# Patient Record
Sex: FEMALE | Race: WHITE | Employment: OTHER | URBAN - NONMETROPOLITAN AREA
[De-identification: names, ages, dates, MRNs, and addresses within clinical notes are randomized per-mention and may not be internally consistent; named-entity substitution may affect disease eponyms.]

---

## 2017-04-11 ENCOUNTER — DOCTOR'S OFFICE (OUTPATIENT)
Dept: URBAN - NONMETROPOLITAN AREA CLINIC 1 | Facility: CLINIC | Age: 82
Setting detail: OPHTHALMOLOGY
End: 2017-04-11
Payer: COMMERCIAL

## 2017-04-11 DIAGNOSIS — Z96.1: ICD-10-CM

## 2017-04-11 DIAGNOSIS — Z02.4: ICD-10-CM

## 2017-04-11 PROCEDURE — 92012 INTRM OPH EXAM EST PATIENT: CPT | Performed by: OPTOMETRIST

## 2017-04-11 PROCEDURE — 92015 DETERMINE REFRACTIVE STATE: CPT | Performed by: OPTOMETRIST

## 2017-04-11 ASSESSMENT — REFRACTION_OUTSIDERX
OS_SPHERE: +0.25
OS_VA3: 20/
OD_VA1: 20/30-1
OS_VA1: 20/40
OS_AXIS: 075
OD_VA3: 20/
OS_CYLINDER: -1.50
OD_ADD: +3.00
OD_CYLINDER: -1.50
OD_SPHERE: PLANO
OS_VA2: 20/40
OD_AXIS: 095
OS_ADD: +3.00
OU_VA: 20/
OD_VA2: 20/30-1

## 2017-04-11 ASSESSMENT — REFRACTION_MANIFEST
OS_VA3: 20/
OD_VA2: 20/
OS_VA2: 20/
OS_VA1: 20/
OU_VA: 20/
OS_VA2: 20/
OD_VA3: 20/
OD_VA3: 20/
OS_VA3: 20/
OU_VA: 20/
OD_VA2: 20/
OS_VA1: 20/
OD_VA1: 20/
OD_VA1: 20/

## 2017-04-12 ASSESSMENT — REFRACTION_CURRENTRX
OS_OVR_VA: 20/
OD_OVR_VA: 20/
OD_CYLINDER: -1.50
OS_OVR_VA: 20/
OS_CYLINDER: -1.50
OD_OVR_VA: 20/
OD_SPHERE: PLANO
OS_SPHERE: -0.25
OS_ADD: +3.00
OD_AXIS: 94
OS_AXIS: 81
OS_OVR_VA: 20/
OD_OVR_VA: 20/
OD_VPRISM_DIRECTION: PROGS
OS_VPRISM_DIRECTION: PROGS
OD_ADD: +3.00

## 2017-04-12 ASSESSMENT — VISUAL ACUITY
OD_BCVA: 20/40
OS_BCVA: 20/30

## 2017-04-12 ASSESSMENT — REFRACTION_AUTOREFRACTION
OS_AXIS: 77
OS_SPHERE: +1.00
OD_AXIS: 79
OS_CYLINDER: -1.75
OD_SPHERE: +0.75
OD_CYLINDER: -1.75

## 2017-04-12 ASSESSMENT — SPHEQUIV_DERIVED
OD_SPHEQUIV: -0.125
OS_SPHEQUIV: 0.125

## 2017-06-13 ENCOUNTER — DOCTOR'S OFFICE (OUTPATIENT)
Dept: URBAN - NONMETROPOLITAN AREA CLINIC 1 | Facility: CLINIC | Age: 82
Setting detail: OPHTHALMOLOGY
End: 2017-06-13
Payer: COMMERCIAL

## 2017-06-13 DIAGNOSIS — Z96.1: ICD-10-CM

## 2017-06-13 DIAGNOSIS — H04.121: ICD-10-CM

## 2017-06-13 DIAGNOSIS — H02.403: ICD-10-CM

## 2017-06-13 DIAGNOSIS — Z02.4: ICD-10-CM

## 2017-06-13 DIAGNOSIS — H04.122: ICD-10-CM

## 2017-06-13 DIAGNOSIS — H35.363: ICD-10-CM

## 2017-06-13 DIAGNOSIS — H26.492: ICD-10-CM

## 2017-06-13 PROCEDURE — 83861 MICROFLUID ANALY TEARS: CPT | Performed by: OPHTHALMOLOGY

## 2017-06-13 PROCEDURE — 92134 CPTRZ OPH DX IMG PST SGM RTA: CPT | Performed by: OPHTHALMOLOGY

## 2017-06-13 PROCEDURE — 92014 COMPRE OPH EXAM EST PT 1/>: CPT | Performed by: OPHTHALMOLOGY

## 2017-06-13 ASSESSMENT — REFRACTION_OUTSIDERX
OD_AXIS: 095
OD_VA1: 20/30-1
OS_CYLINDER: -1.50
OD_VA2: 20/30-1
OD_ADD: +3.00
OS_AXIS: 075
OS_VA1: 20/40
OS_SPHERE: +0.25
OU_VA: 20/
OS_ADD: +3.00
OD_VA3: 20/
OS_VA3: 20/
OS_VA2: 20/40
OD_CYLINDER: -1.50
OD_SPHERE: PLANO

## 2017-06-13 ASSESSMENT — SPHEQUIV_DERIVED
OD_SPHEQUIV: 0.25
OS_SPHEQUIV: -0.75

## 2017-06-13 ASSESSMENT — REFRACTION_MANIFEST
OU_VA: 20/
OS_VA2: 20/
OD_VA2: 20/
OD_VA3: 20/
OS_VA1: 20/
OS_VA1: 20/
OD_VA1: 20/
OD_VA3: 20/
OS_VA2: 20/
OS_VA3: 20/
OD_VA1: 20/
OS_VA3: 20/
OU_VA: 20/
OD_VA2: 20/

## 2017-06-13 ASSESSMENT — REFRACTION_CURRENTRX
OS_OVR_VA: 20/
OS_OVR_VA: 20/
OD_OVR_VA: 20/
OS_OVR_VA: 20/
OD_CYLINDER: -1.50
OS_SPHERE: -0.25
OS_ADD: +3.00
OS_CYLINDER: -1.50
OD_OVR_VA: 20/
OD_VPRISM_DIRECTION: PROGS
OD_ADD: +3.00
OD_SPHERE: PLANO
OS_AXIS: 79
OS_VPRISM_DIRECTION: PROGS
OD_AXIS: 95
OD_OVR_VA: 20/

## 2017-06-13 ASSESSMENT — VISUAL ACUITY
OD_BCVA: 20/50-1
OS_BCVA: 20/50+2

## 2017-06-13 ASSESSMENT — REFRACTION_AUTOREFRACTION
OS_CYLINDER: -2.00
OD_SPHERE: +1.00
OS_AXIS: 112
OS_SPHERE: +0.25
OD_CYLINDER: -1.50
OD_AXIS: 82

## 2017-06-13 ASSESSMENT — DRY EYES - PHYSICIAN NOTES
OD_GENERALCOMMENTS: KSICCA
OS_GENERALCOMMENTS: KSICCA

## 2017-06-13 ASSESSMENT — CONFRONTATIONAL VISUAL FIELD TEST (CVF)
OS_FINDINGS: FULL
OD_FINDINGS: FULL

## 2017-06-13 ASSESSMENT — SUPERFICIAL PUNCTATE KERATITIS (SPK)
OD_SPK: 1+
OS_SPK: 1+

## 2017-06-13 ASSESSMENT — LID POSITION - PTOSIS
OS_PTOSIS: LUL T
OD_PTOSIS: RUL T

## 2017-08-22 ENCOUNTER — DOCTOR'S OFFICE (OUTPATIENT)
Dept: URBAN - NONMETROPOLITAN AREA CLINIC 1 | Facility: CLINIC | Age: 82
Setting detail: OPHTHALMOLOGY
End: 2017-08-22
Payer: COMMERCIAL

## 2017-08-22 ENCOUNTER — RX ONLY (RX ONLY)
Age: 82
End: 2017-08-22

## 2017-08-22 DIAGNOSIS — Z96.1: ICD-10-CM

## 2017-08-22 DIAGNOSIS — H52.4: ICD-10-CM

## 2017-08-22 PROCEDURE — 92015 DETERMINE REFRACTIVE STATE: CPT | Performed by: OPTOMETRIST

## 2017-08-22 ASSESSMENT — REFRACTION_CURRENTRX
OD_OVR_VA: 20/
OS_ADD: +3.00
OS_OVR_VA: 20/
OD_AXIS: 101
OD_SPHERE: PLANO
OS_SPHERE: -0.25
OD_OVR_VA: 20/
OD_ADD: +3.00
OS_OVR_VA: 20/
OS_OVR_VA: 20/
OS_VPRISM_DIRECTION: PROGS
OD_VPRISM_DIRECTION: PROGS
OS_AXIS: 80
OD_CYLINDER: -1.50
OD_OVR_VA: 20/
OS_CYLINDER: -1.50

## 2017-08-22 ASSESSMENT — REFRACTION_MANIFEST
OD_VA3: 20/
OS_VA1: 20/
OD_VA1: 20/
OD_VA2: 20/
OS_VA1: 20/
OS_VA2: 20/
OD_VA1: 20/
OD_VA3: 20/
OS_VA3: 20/
OU_VA: 20/
OS_VA2: 20/
OU_VA: 20/
OS_VA3: 20/
OD_VA2: 20/

## 2017-08-22 ASSESSMENT — REFRACTION_OUTSIDERX
OD_AXIS: 095
OS_ADD: +3.00
OS_VA2: 20/50+2
OD_VA3: 20/
OS_CYLINDER: -1.50
OD_CYLINDER: -1.50
OS_VA1: 20/50+2
OD_ADD: +3.00
OD_VA1: 20/30-1
OU_VA: 20/
OD_VA2: 20/30-1
OD_SPHERE: PLANO
OS_SPHERE: +0.25
OS_AXIS: 075
OS_VA3: 20/

## 2017-08-22 ASSESSMENT — REFRACTION_AUTOREFRACTION
OS_SPHERE: +0.50
OS_AXIS: 82
OD_CYLINDER: -2.25
OS_CYLINDER: -1.00
OD_SPHERE: +0.75
OD_AXIS: 84

## 2017-08-22 ASSESSMENT — SPHEQUIV_DERIVED
OD_SPHEQUIV: -0.375
OS_SPHEQUIV: 0

## 2017-08-22 ASSESSMENT — VISUAL ACUITY
OS_BCVA: 20/30-1
OD_BCVA: 20/60-2

## 2017-12-06 ENCOUNTER — DOCTOR'S OFFICE (OUTPATIENT)
Dept: URBAN - NONMETROPOLITAN AREA CLINIC 1 | Facility: CLINIC | Age: 82
Setting detail: OPHTHALMOLOGY
End: 2017-12-06
Payer: COMMERCIAL

## 2017-12-06 DIAGNOSIS — H04.123: ICD-10-CM

## 2017-12-06 DIAGNOSIS — H04.122: ICD-10-CM

## 2017-12-06 DIAGNOSIS — H35.3131: ICD-10-CM

## 2017-12-06 DIAGNOSIS — Z96.1: ICD-10-CM

## 2017-12-06 DIAGNOSIS — H04.121: ICD-10-CM

## 2017-12-06 DIAGNOSIS — H26.492: ICD-10-CM

## 2017-12-06 PROCEDURE — 92134 CPTRZ OPH DX IMG PST SGM RTA: CPT | Performed by: OPHTHALMOLOGY

## 2017-12-06 PROCEDURE — 83861 MICROFLUID ANALY TEARS: CPT | Performed by: OPHTHALMOLOGY

## 2017-12-06 PROCEDURE — 92014 COMPRE OPH EXAM EST PT 1/>: CPT | Performed by: OPHTHALMOLOGY

## 2017-12-06 ASSESSMENT — LID POSITION - PTOSIS
OS_PTOSIS: LUL T
OD_PTOSIS: RUL T

## 2017-12-06 ASSESSMENT — REFRACTION_OUTSIDERX
OU_VA: 20/
OS_VA2: 20/50+2
OD_VA1: 20/30-1
OD_VA2: 20/30-1
OS_SPHERE: +0.25
OS_ADD: +3.00
OD_ADD: +3.00
OD_CYLINDER: -1.50
OS_AXIS: 075
OD_SPHERE: PLANO
OS_CYLINDER: -1.50
OS_VA3: 20/
OD_VA3: 20/
OD_AXIS: 095
OS_VA1: 20/50+2

## 2017-12-06 ASSESSMENT — REFRACTION_MANIFEST
OD_VA2: 20/
OD_VA3: 20/
OD_VA2: 20/
OS_VA1: 20/
OD_VA1: 20/
OS_VA2: 20/
OD_VA1: 20/
OD_VA3: 20/
OU_VA: 20/
OS_VA3: 20/
OS_VA3: 20/
OS_VA2: 20/
OU_VA: 20/
OS_VA1: 20/

## 2017-12-06 ASSESSMENT — REFRACTION_CURRENTRX
OS_ADD: +3.00
OS_VPRISM_DIRECTION: PROGS
OD_CYLINDER: -1.50
OS_OVR_VA: 20/
OD_OVR_VA: 20/
OS_AXIS: 80
OS_CYLINDER: -1.50
OD_OVR_VA: 20/
OD_ADD: +3.00
OS_SPHERE: -0.25
OD_OVR_VA: 20/
OS_OVR_VA: 20/
OD_VPRISM_DIRECTION: PROGS
OD_SPHERE: PLANO
OS_OVR_VA: 20/
OD_AXIS: 101

## 2017-12-06 ASSESSMENT — DRY EYES - PHYSICIAN NOTES
OS_GENERALCOMMENTS: KSICCA
OD_GENERALCOMMENTS: KSICCA

## 2017-12-06 ASSESSMENT — REFRACTION_AUTOREFRACTION
OS_AXIS: 82
OD_SPHERE: +0.75
OD_AXIS: 84
OS_SPHERE: +0.50
OD_CYLINDER: -2.25
OS_CYLINDER: -1.00

## 2017-12-06 ASSESSMENT — VISUAL ACUITY
OD_BCVA: 20/40
OS_BCVA: 20/50

## 2017-12-06 ASSESSMENT — SUPERFICIAL PUNCTATE KERATITIS (SPK)
OD_SPK: 1+
OS_SPK: 1+

## 2017-12-06 ASSESSMENT — SPHEQUIV_DERIVED
OS_SPHEQUIV: 0
OD_SPHEQUIV: -0.375

## 2017-12-06 ASSESSMENT — CONFRONTATIONAL VISUAL FIELD TEST (CVF)
OS_FINDINGS: FULL
OD_FINDINGS: FULL

## 2018-06-22 ENCOUNTER — DOCTOR'S OFFICE (OUTPATIENT)
Dept: URBAN - NONMETROPOLITAN AREA CLINIC 1 | Facility: CLINIC | Age: 83
Setting detail: OPHTHALMOLOGY
End: 2018-06-22
Payer: MEDICARE

## 2018-06-22 DIAGNOSIS — Z96.1: ICD-10-CM

## 2018-06-22 DIAGNOSIS — H35.373: ICD-10-CM

## 2018-06-22 DIAGNOSIS — H04.121: ICD-10-CM

## 2018-06-22 DIAGNOSIS — H26.492: ICD-10-CM

## 2018-06-22 DIAGNOSIS — H35.3131: ICD-10-CM

## 2018-06-22 DIAGNOSIS — H04.122: ICD-10-CM

## 2018-06-22 DIAGNOSIS — H04.123: ICD-10-CM

## 2018-06-22 PROCEDURE — 83861 MICROFLUID ANALY TEARS: CPT | Performed by: OPHTHALMOLOGY

## 2018-06-22 PROCEDURE — 92134 CPTRZ OPH DX IMG PST SGM RTA: CPT | Performed by: OPHTHALMOLOGY

## 2018-06-22 PROCEDURE — 92014 COMPRE OPH EXAM EST PT 1/>: CPT | Performed by: OPHTHALMOLOGY

## 2018-06-22 ASSESSMENT — SUPERFICIAL PUNCTATE KERATITIS (SPK)
OS_SPK: 1+
OD_SPK: 1+

## 2018-06-22 ASSESSMENT — DRY EYES - PHYSICIAN NOTES
OD_GENERALCOMMENTS: KSICCA
OS_GENERALCOMMENTS: KSICCA

## 2018-06-22 ASSESSMENT — REFRACTION_OUTSIDERX
OS_SPHERE: +0.25
OS_ADD: +3.00
OS_VA3: 20/
OS_CYLINDER: -1.50
OS_AXIS: 075
OS_VA2: 20/50+2
OU_VA: 20/
OD_ADD: +3.00
OD_VA2: 20/30-1
OD_CYLINDER: -1.50
OD_VA3: 20/
OD_AXIS: 095
OD_SPHERE: PLANO
OS_VA1: 20/50+2
OD_VA1: 20/30-1

## 2018-06-22 ASSESSMENT — REFRACTION_MANIFEST
OU_VA: 20/
OS_VA3: 20/
OD_VA1: 20/
OS_VA1: 20/
OS_VA2: 20/
OD_VA3: 20/
OD_VA3: 20/
OU_VA: 20/
OD_VA2: 20/
OD_VA2: 20/
OS_VA1: 20/
OD_VA1: 20/
OS_VA2: 20/
OS_VA3: 20/

## 2018-06-22 ASSESSMENT — REFRACTION_AUTOREFRACTION
OD_SPHERE: +0.25
OS_AXIS: 104
OS_SPHERE: +0.25
OD_CYLINDER: -2.25
OD_AXIS: 082
OS_CYLINDER: -2.00

## 2018-06-22 ASSESSMENT — REFRACTION_CURRENTRX
OD_SPHERE: PLANO
OS_CYLINDER: -1.50
OS_SPHERE: -0.25
OD_OVR_VA: 20/
OS_AXIS: 80
OD_VPRISM_DIRECTION: PROGS
OD_AXIS: 101
OS_ADD: +3.00
OS_OVR_VA: 20/
OD_OVR_VA: 20/
OS_VPRISM_DIRECTION: PROGS
OS_OVR_VA: 20/
OD_CYLINDER: -1.50
OS_OVR_VA: 20/
OD_OVR_VA: 20/
OD_ADD: +3.00

## 2018-06-22 ASSESSMENT — VISUAL ACUITY
OD_BCVA: 20/60-1
OS_BCVA: 20/50-1

## 2018-06-22 ASSESSMENT — CONFRONTATIONAL VISUAL FIELD TEST (CVF)
OD_FINDINGS: FULL
OS_FINDINGS: FULL

## 2018-06-22 ASSESSMENT — LID POSITION - PTOSIS
OS_PTOSIS: LUL T
OD_PTOSIS: RUL T

## 2018-06-22 ASSESSMENT — SPHEQUIV_DERIVED
OS_SPHEQUIV: -0.75
OD_SPHEQUIV: -0.875

## 2018-11-27 ENCOUNTER — DOCTOR'S OFFICE (OUTPATIENT)
Dept: URBAN - NONMETROPOLITAN AREA CLINIC 1 | Facility: CLINIC | Age: 83
Setting detail: OPHTHALMOLOGY
End: 2018-11-27
Payer: MEDICARE

## 2018-11-27 DIAGNOSIS — H26.492: ICD-10-CM

## 2018-11-27 DIAGNOSIS — H35.3131: ICD-10-CM

## 2018-11-27 DIAGNOSIS — H04.122: ICD-10-CM

## 2018-11-27 DIAGNOSIS — H35.373: ICD-10-CM

## 2018-11-27 DIAGNOSIS — H04.123: ICD-10-CM

## 2018-11-27 PROCEDURE — 92134 CPTRZ OPH DX IMG PST SGM RTA: CPT | Performed by: OPHTHALMOLOGY

## 2018-11-27 PROCEDURE — 83861 MICROFLUID ANALY TEARS: CPT | Performed by: OPHTHALMOLOGY

## 2018-11-27 PROCEDURE — 92014 COMPRE OPH EXAM EST PT 1/>: CPT | Performed by: OPHTHALMOLOGY

## 2018-11-27 ASSESSMENT — SUPERFICIAL PUNCTATE KERATITIS (SPK)
OD_SPK: 1+
OS_SPK: 1+

## 2018-11-27 ASSESSMENT — REFRACTION_CURRENTRX
OD_OVR_VA: 20/
OS_OVR_VA: 20/
OD_SPHERE: PLANO
OS_ADD: +2.50
OS_AXIS: 079
OS_OVR_VA: 20/
OS_CYLINDER: -1.75
OD_VPRISM_DIRECTION: PROGS
OS_OVR_VA: 20/
OD_OVR_VA: 20/
OD_OVR_VA: 20/
OD_ADD: +2.50
OD_AXIS: 098
OS_SPHERE: PLANO
OS_VPRISM_DIRECTION: PROGS
OD_CYLINDER: -1.75

## 2018-11-27 ASSESSMENT — CONFRONTATIONAL VISUAL FIELD TEST (CVF)
OD_FINDINGS: FULL
OS_FINDINGS: FULL

## 2018-11-27 ASSESSMENT — DRY EYES - PHYSICIAN NOTES
OS_GENERALCOMMENTS: KSICCA
OD_GENERALCOMMENTS: KSICCA

## 2018-11-27 ASSESSMENT — SPHEQUIV_DERIVED
OS_SPHEQUIV: -0.5
OS_SPHEQUIV: -0.375
OD_SPHEQUIV: -0.75

## 2018-11-27 ASSESSMENT — VISUAL ACUITY
OD_BCVA: 20/30-1
OS_BCVA: 20/40-2

## 2018-11-27 ASSESSMENT — REFRACTION_MANIFEST
OD_VA1: 20/
OD_CYLINDER: -1.50
OS_ADD: +3.00
OS_CYLINDER: -1.50
OS_VA1: 20/
OS_VA2: 20/50+2
OS_AXIS: 075
OD_AXIS: 095
OD_SPHERE: PLANO
OD_VA2: 20/30-1
OS_VA1: 20/50+2
OD_VA2: 20/
OD_ADD: +3.00
OS_VA3: 20/
OU_VA: 20/
OS_SPHERE: +0.25
OD_VA1: 20/30-1
OS_VA2: 20/
OS_VA3: 20/
OD_VA3: 20/
OU_VA: 20/
OD_VA3: 20/

## 2018-11-27 ASSESSMENT — REFRACTION_AUTOREFRACTION
OS_SPHERE: 0.00
OD_SPHERE: +0.50
OD_CYLINDER: -2.50
OD_AXIS: 077
OS_CYLINDER: -0.75
OS_AXIS: 082

## 2018-11-27 ASSESSMENT — LID POSITION - PTOSIS
OD_PTOSIS: RUL T
OS_PTOSIS: LUL T

## 2019-05-28 ENCOUNTER — DOCTOR'S OFFICE (OUTPATIENT)
Dept: URBAN - NONMETROPOLITAN AREA CLINIC 1 | Facility: CLINIC | Age: 84
Setting detail: OPHTHALMOLOGY
End: 2019-05-28
Payer: MEDICARE

## 2019-05-28 DIAGNOSIS — H26.492: ICD-10-CM

## 2019-05-28 DIAGNOSIS — H04.122: ICD-10-CM

## 2019-05-28 DIAGNOSIS — H35.373: ICD-10-CM

## 2019-05-28 DIAGNOSIS — Z96.1: ICD-10-CM

## 2019-05-28 DIAGNOSIS — H35.363: ICD-10-CM

## 2019-05-28 DIAGNOSIS — H04.123: ICD-10-CM

## 2019-05-28 DIAGNOSIS — H35.3131: ICD-10-CM

## 2019-05-28 PROCEDURE — 92134 CPTRZ OPH DX IMG PST SGM RTA: CPT | Performed by: OPHTHALMOLOGY

## 2019-05-28 PROCEDURE — 83861 MICROFLUID ANALY TEARS: CPT | Performed by: OPHTHALMOLOGY

## 2019-05-28 PROCEDURE — 92014 COMPRE OPH EXAM EST PT 1/>: CPT | Performed by: OPHTHALMOLOGY

## 2019-05-28 ASSESSMENT — REFRACTION_MANIFEST
OD_VA3: 20/
OS_VA1: 20/50+2
OS_SPHERE: +0.25
OD_VA2: 20/30-1
OS_CYLINDER: -1.50
OD_ADD: +3.00
OS_AXIS: 075
OS_VA1: 20/
OD_VA3: 20/
OS_VA3: 20/
OS_VA2: 20/50+2
OS_VA2: 20/
OD_VA1: 20/
OD_AXIS: 095
OU_VA: 20/
OD_CYLINDER: -1.50
OD_VA1: 20/30-1
OS_VA3: 20/
OU_VA: 20/
OS_ADD: +3.00
OD_SPHERE: PLANO
OD_VA2: 20/

## 2019-05-28 ASSESSMENT — REFRACTION_CURRENTRX
OS_AXIS: 072
OS_OVR_VA: 20/
OD_OVR_VA: 20/
OS_SPHERE: PLANO
OD_CYLINDER: -1.75
OS_CYLINDER: -1.75
OD_ADD: +2.50
OS_ADD: +2.50
OD_VPRISM_DIRECTION: PROGS
OD_OVR_VA: 20/
OD_OVR_VA: 20/
OS_VPRISM_DIRECTION: PROGS
OD_AXIS: 071
OS_OVR_VA: 20/
OS_OVR_VA: 20/
OD_SPHERE: PLANO

## 2019-05-28 ASSESSMENT — REFRACTION_AUTOREFRACTION
OD_SPHERE: 0.00
OS_SPHERE: -0.50
OD_AXIS: 088
OD_CYLINDER: -1.50
OS_CYLINDER: -0.25
OS_AXIS: 099

## 2019-05-28 ASSESSMENT — SPHEQUIV_DERIVED
OD_SPHEQUIV: -0.75
OS_SPHEQUIV: -0.5
OS_SPHEQUIV: -0.625

## 2019-05-28 ASSESSMENT — CONFRONTATIONAL VISUAL FIELD TEST (CVF)
OS_FINDINGS: FULL
OD_FINDINGS: FULL

## 2019-05-28 ASSESSMENT — DRY EYES - PHYSICIAN NOTES
OS_GENERALCOMMENTS: KSICCA
OD_GENERALCOMMENTS: KSICCA

## 2019-05-28 ASSESSMENT — VISUAL ACUITY
OS_BCVA: 20/50+1
OD_BCVA: 20/50-1

## 2019-05-28 ASSESSMENT — SUPERFICIAL PUNCTATE KERATITIS (SPK)
OD_SPK: 1+
OS_SPK: 1+

## 2019-05-28 ASSESSMENT — LID POSITION - PTOSIS
OD_PTOSIS: RUL T
OS_PTOSIS: LUL T

## 2019-12-13 ENCOUNTER — DOCTOR'S OFFICE (OUTPATIENT)
Dept: URBAN - NONMETROPOLITAN AREA CLINIC 1 | Facility: CLINIC | Age: 84
Setting detail: OPHTHALMOLOGY
End: 2019-12-13
Payer: MEDICARE

## 2019-12-13 DIAGNOSIS — H02.102: ICD-10-CM

## 2019-12-13 DIAGNOSIS — H02.105: ICD-10-CM

## 2019-12-13 DIAGNOSIS — H04.123: ICD-10-CM

## 2019-12-13 DIAGNOSIS — Z96.1: ICD-10-CM

## 2019-12-13 PROCEDURE — 99214 OFFICE O/P EST MOD 30 MIN: CPT | Performed by: OPHTHALMOLOGY

## 2019-12-13 ASSESSMENT — CONFRONTATIONAL VISUAL FIELD TEST (CVF)
OD_FINDINGS: FULL
OS_FINDINGS: FULL

## 2019-12-13 ASSESSMENT — DRY EYES - PHYSICIAN NOTES
OD_GENERALCOMMENTS: KSICCA
OS_GENERALCOMMENTS: KSICCA

## 2019-12-13 ASSESSMENT — SUPERFICIAL PUNCTATE KERATITIS (SPK)
OS_SPK: 1+
OD_SPK: 1+

## 2019-12-13 ASSESSMENT — LID POSITION - PTOSIS
OS_PTOSIS: LUL T
OD_PTOSIS: RUL T

## 2019-12-17 ASSESSMENT — REFRACTION_MANIFEST
OD_VA3: 20/
OD_VA3: 20/
OD_ADD: +3.00
OS_VA3: 20/
OS_VA3: 20/
OU_VA: 20/
OD_VA2: 20/
OD_VA1: 20/
OS_VA2: 20/50+2
OS_ADD: +3.00
OD_SPHERE: PLANO
OS_CYLINDER: -1.50
OS_VA1: 20/50+2
OD_VA2: 20/30-1
OS_AXIS: 075
OS_VA2: 20/
OD_AXIS: 095
OD_CYLINDER: -1.50
OS_VA1: 20/
OU_VA: 20/
OD_VA1: 20/30-1
OS_SPHERE: +0.25

## 2019-12-17 ASSESSMENT — REFRACTION_CURRENTRX
OD_OVR_VA: 20/
OS_OVR_VA: 20/
OD_AXIS: 071
OS_AXIS: 072
OD_OVR_VA: 20/
OD_CYLINDER: -1.75
OS_ADD: +2.50
OS_VPRISM_DIRECTION: PROGS
OD_ADD: +2.50
OD_VPRISM_DIRECTION: PROGS
OD_SPHERE: PLANO
OS_OVR_VA: 20/
OS_SPHERE: PLANO
OS_OVR_VA: 20/
OS_CYLINDER: -1.75
OD_OVR_VA: 20/

## 2019-12-17 ASSESSMENT — REFRACTION_AUTOREFRACTION
OD_CYLINDER: -1.75
OS_SPHERE: +0.25
OD_AXIS: 101
OS_AXIS: 70
OD_SPHERE: 0.00
OS_CYLINDER: -1.00

## 2019-12-17 ASSESSMENT — SPHEQUIV_DERIVED
OS_SPHEQUIV: -0.25
OD_SPHEQUIV: -0.875
OS_SPHEQUIV: -0.5

## 2019-12-17 ASSESSMENT — VISUAL ACUITY
OD_BCVA: 20/50-1
OS_BCVA: 20/60-2

## 2019-12-27 ENCOUNTER — RX ONLY (RX ONLY)
Age: 84
End: 2019-12-27

## 2019-12-27 ENCOUNTER — DOCTOR'S OFFICE (OUTPATIENT)
Dept: URBAN - NONMETROPOLITAN AREA CLINIC 1 | Facility: CLINIC | Age: 84
Setting detail: OPHTHALMOLOGY
End: 2019-12-27
Payer: MEDICARE

## 2019-12-27 DIAGNOSIS — H04.122: ICD-10-CM

## 2019-12-27 DIAGNOSIS — H02.102: ICD-10-CM

## 2019-12-27 DIAGNOSIS — Z96.1: ICD-10-CM

## 2019-12-27 DIAGNOSIS — H04.121: ICD-10-CM

## 2019-12-27 DIAGNOSIS — H02.105: ICD-10-CM

## 2019-12-27 DIAGNOSIS — H10.503: ICD-10-CM

## 2019-12-27 PROCEDURE — 92012 INTRM OPH EXAM EST PATIENT: CPT | Performed by: OPHTHALMOLOGY

## 2019-12-27 ASSESSMENT — SPHEQUIV_DERIVED
OS_SPHEQUIV: -0.5
OS_SPHEQUIV: -0.75
OD_SPHEQUIV: 0

## 2019-12-27 ASSESSMENT — REFRACTION_MANIFEST
OD_VA3: 20/
OS_AXIS: 075
OD_AXIS: 095
OS_VA2: 20/30-1
OS_CYLINDER: -1.50
OD_CYLINDER: -1.50
OD_ADD: +3.00
OS_VA1: 20/50+2
OD_SPHERE: PLANO
OS_ADD: +3.00
OS_SPHERE: +0.25
OU_VA: 20/
OD_VA2: 20/30-1
OS_VA3: 20/
OD_VA1: 20/30-1

## 2019-12-27 ASSESSMENT — REFRACTION_AUTOREFRACTION
OS_AXIS: 128
OD_CYLINDER: -3.00
OS_CYLINDER: -0.50
OD_SPHERE: +1.50
OS_SPHERE: -0.50
OD_AXIS: 091

## 2019-12-27 ASSESSMENT — REFRACTION_CURRENTRX
OS_AXIS: 072
OD_VPRISM_DIRECTION: PROGS
OS_CYLINDER: -1.75
OS_ADD: +2.50
OD_CYLINDER: -1.75
OS_VPRISM_DIRECTION: PROGS
OS_SPHERE: PLANO
OD_OVR_VA: 20/
OS_OVR_VA: 20/
OD_ADD: +2.50
OD_AXIS: 071
OD_SPHERE: PLANO

## 2019-12-27 ASSESSMENT — VISUAL ACUITY
OS_BCVA: 20/60-2
OD_BCVA: 20/50-2

## 2019-12-27 ASSESSMENT — LID POSITION - ENTROPION
OS_ENTROPION: ABSENT
OD_ENTROPION: ABSENT

## 2019-12-27 ASSESSMENT — SUPERFICIAL PUNCTATE KERATITIS (SPK)
OS_SPK: T
OD_SPK: 1+ 2+

## 2019-12-27 ASSESSMENT — CONFRONTATIONAL VISUAL FIELD TEST (CVF)
OD_FINDINGS: FULL
OS_FINDINGS: FULL

## 2019-12-27 ASSESSMENT — DRY EYES - PHYSICIAN NOTES
OS_GENERALCOMMENTS: KSICCA
OD_GENERALCOMMENTS: KSICCA

## 2019-12-27 ASSESSMENT — LID POSITION - PTOSIS
OS_PTOSIS: LUL T
OD_PTOSIS: RUL T

## 2020-06-09 ENCOUNTER — DOCTOR'S OFFICE (OUTPATIENT)
Dept: URBAN - NONMETROPOLITAN AREA CLINIC 1 | Facility: CLINIC | Age: 85
Setting detail: OPHTHALMOLOGY
End: 2020-06-09
Payer: MEDICARE

## 2020-06-09 DIAGNOSIS — H35.373: ICD-10-CM

## 2020-06-09 DIAGNOSIS — H02.102: ICD-10-CM

## 2020-06-09 DIAGNOSIS — Z96.1: ICD-10-CM

## 2020-06-09 DIAGNOSIS — H35.3131: ICD-10-CM

## 2020-06-09 DIAGNOSIS — H04.121: ICD-10-CM

## 2020-06-09 DIAGNOSIS — H02.105: ICD-10-CM

## 2020-06-09 DIAGNOSIS — H04.123: ICD-10-CM

## 2020-06-09 DIAGNOSIS — H04.122: ICD-10-CM

## 2020-06-09 PROBLEM — H10.503 BLEPHAROCONUNCTIVITIS, UNSPECIFIED; BOTH EYES: Status: RESOLVED | Noted: 2019-12-13 | Resolved: 2020-06-09

## 2020-06-09 PROCEDURE — 92134 CPTRZ OPH DX IMG PST SGM RTA: CPT | Performed by: OPHTHALMOLOGY

## 2020-06-09 PROCEDURE — 83861 MICROFLUID ANALY TEARS: CPT | Performed by: OPHTHALMOLOGY

## 2020-06-09 PROCEDURE — 92014 COMPRE OPH EXAM EST PT 1/>: CPT | Performed by: OPHTHALMOLOGY

## 2020-06-09 ASSESSMENT — REFRACTION_CURRENTRX
OD_ADD: +3.00
OS_AXIS: 081
OS_VPRISM_DIRECTION: PROGS
OS_SPHERE: PLANO
OS_OVR_VA: 20/
OD_OVR_VA: 20/
OS_ADD: +3.00
OS_CYLINDER: -1.50
OD_SPHERE: PLANO
OD_CYLINDER: -1.50
OD_VPRISM_DIRECTION: PROGS
OD_AXIS: 094

## 2020-06-09 ASSESSMENT — REFRACTION_MANIFEST
OS_VA2: 20/30-1
OS_VA1: 20/50+2
OD_SPHERE: PLANO
OS_ADD: +3.00
OS_SPHERE: +0.25
OS_AXIS: 075
OD_ADD: +3.00
OD_CYLINDER: -1.50
OS_CYLINDER: -1.50
OD_AXIS: 095
OD_VA2: 20/30-1
OD_VA1: 20/30-1

## 2020-06-09 ASSESSMENT — DRY EYES - PHYSICIAN NOTES
OD_GENERALCOMMENTS: KSICCA
OS_GENERALCOMMENTS: KSICCA

## 2020-06-09 ASSESSMENT — REFRACTION_AUTOREFRACTION
OD_SPHERE: +1.00
OS_SPHERE: +0.25
OS_AXIS: 092
OD_AXIS: 088
OD_CYLINDER: -3.00
OS_CYLINDER: -1.50

## 2020-06-09 ASSESSMENT — LID POSITION - PTOSIS
OS_PTOSIS: LUL T
OD_PTOSIS: RUL T

## 2020-06-09 ASSESSMENT — LID POSITION - ENTROPION
OS_ENTROPION: ABSENT
OD_ENTROPION: ABSENT

## 2020-06-09 ASSESSMENT — CONFRONTATIONAL VISUAL FIELD TEST (CVF)
OD_FINDINGS: FULL
OS_FINDINGS: FULL

## 2020-06-09 ASSESSMENT — SPHEQUIV_DERIVED
OD_SPHEQUIV: -0.5
OS_SPHEQUIV: -0.5
OS_SPHEQUIV: -0.5

## 2020-06-09 ASSESSMENT — VISUAL ACUITY
OD_BCVA: 20/50-2
OS_BCVA: 20/50

## 2021-01-05 ENCOUNTER — DOCTOR'S OFFICE (OUTPATIENT)
Dept: URBAN - NONMETROPOLITAN AREA CLINIC 1 | Facility: CLINIC | Age: 86
Setting detail: OPHTHALMOLOGY
End: 2021-01-05
Payer: MEDICARE

## 2021-01-05 DIAGNOSIS — H04.123: ICD-10-CM

## 2021-01-05 DIAGNOSIS — H26.492: ICD-10-CM

## 2021-01-05 DIAGNOSIS — H35.373: ICD-10-CM

## 2021-01-05 DIAGNOSIS — Z96.1: ICD-10-CM

## 2021-01-05 DIAGNOSIS — H35.3131: ICD-10-CM

## 2021-01-05 PROBLEM — H02.40 PTOSIS; BOTH EYES: Status: ACTIVE | Noted: 2020-06-09

## 2021-01-05 PROBLEM — H50.15: Status: ACTIVE | Noted: 2020-06-09

## 2021-01-05 PROBLEM — H02.43 PTOSIS; BOTH EYES: Status: ACTIVE | Noted: 2020-06-09

## 2021-01-05 PROBLEM — H02.41 PTOSIS; BOTH EYES: Status: ACTIVE | Noted: 2020-06-09

## 2021-01-05 PROBLEM — H02.105 ECTROPION; RIGHT LOWER LID, LEFT LOWER LID: Status: ACTIVE | Noted: 2019-12-13

## 2021-01-05 PROBLEM — H04.121 DRY EYE; RIGHT EYE, LEFT EYE: Status: ACTIVE | Noted: 2017-06-13

## 2021-01-05 PROBLEM — H02.102 ECTROPION; RIGHT LOWER LID, LEFT LOWER LID: Status: ACTIVE | Noted: 2019-12-13

## 2021-01-05 PROBLEM — H04.122 DRY EYE; RIGHT EYE, LEFT EYE: Status: ACTIVE | Noted: 2017-06-13

## 2021-01-05 PROBLEM — Z02.4 ENCOUNTER FOR EXAMINATION FOR DRIVING LICENSE: Status: ACTIVE | Noted: 2017-04-11

## 2021-01-05 PROBLEM — H02.42 PTOSIS; BOTH EYES: Status: ACTIVE | Noted: 2020-06-09

## 2021-01-05 PROCEDURE — 92134 CPTRZ OPH DX IMG PST SGM RTA: CPT | Performed by: OPHTHALMOLOGY

## 2021-01-05 PROCEDURE — 92012 INTRM OPH EXAM EST PATIENT: CPT | Performed by: OPHTHALMOLOGY

## 2021-01-05 ASSESSMENT — SPHEQUIV_DERIVED
OD_SPHEQUIV: -0.5
OS_SPHEQUIV: -0.5
OS_SPHEQUIV: -0.5

## 2021-01-05 ASSESSMENT — TONOMETRY
OS_IOP_MMHG: 15
OD_IOP_MMHG: 15

## 2021-01-05 ASSESSMENT — REFRACTION_MANIFEST
OS_AXIS: 075
OD_AXIS: 095
OS_VA2: 20/30-1
OD_SPHERE: PLANO
OD_VA2: 20/30-1
OS_ADD: +3.00
OS_VA1: 20/50+2
OS_SPHERE: +0.25
OS_CYLINDER: -1.50
OD_CYLINDER: -1.50
OD_ADD: +3.00
OD_VA1: 20/30-1

## 2021-01-05 ASSESSMENT — REFRACTION_CURRENTRX
OD_CYLINDER: -1.50
OS_ADD: +3.00
OD_SPHERE: PLANO
OS_VPRISM_DIRECTION: PROGS
OS_CYLINDER: -1.50
OS_AXIS: 081
OS_OVR_VA: 20/
OD_AXIS: 094
OD_VPRISM_DIRECTION: PROGS
OD_ADD: +3.00
OS_SPHERE: PLANO
OD_OVR_VA: 20/

## 2021-01-05 ASSESSMENT — REFRACTION_AUTOREFRACTION
OS_SPHERE: +0.25
OS_CYLINDER: -1.50
OD_CYLINDER: -3.00
OD_SPHERE: +1.00
OS_AXIS: 092
OD_AXIS: 088

## 2021-01-05 ASSESSMENT — LID POSITION - PTOSIS
OD_PTOSIS: RUL T
OS_PTOSIS: LUL T

## 2021-01-05 ASSESSMENT — DRY EYES - PHYSICIAN NOTES
OD_GENERALCOMMENTS: KSICCA
OS_GENERALCOMMENTS: KSICCA

## 2021-01-05 ASSESSMENT — MACULA - DRUSEN
OD_DRUSEN: 1+ T
OS_DRUSEN: 1+ T

## 2021-01-05 ASSESSMENT — VISUAL ACUITY
OS_BCVA: 20/50
OD_BCVA: 20/50+1

## 2021-01-05 ASSESSMENT — LID POSITION - ENTROPION
OS_ENTROPION: ABSENT
OD_ENTROPION: ABSENT

## 2021-02-12 ENCOUNTER — IMMUNIZATIONS (OUTPATIENT)
Dept: FAMILY MEDICINE CLINIC | Facility: HOSPITAL | Age: 86
End: 2021-02-12

## 2021-02-12 DIAGNOSIS — Z23 ENCOUNTER FOR IMMUNIZATION: Primary | ICD-10-CM

## 2021-02-12 PROCEDURE — 91301 SARS-COV-2 / COVID-19 MRNA VACCINE (MODERNA) 100 MCG: CPT

## 2021-02-12 PROCEDURE — 0011A SARS-COV-2 / COVID-19 MRNA VACCINE (MODERNA) 100 MCG: CPT

## 2021-03-03 ENCOUNTER — TRANSCRIBE ORDERS (OUTPATIENT)
Dept: ADMINISTRATIVE | Facility: HOSPITAL | Age: 86
End: 2021-03-03

## 2021-03-03 ENCOUNTER — HOSPITAL ENCOUNTER (OUTPATIENT)
Dept: NON INVASIVE DIAGNOSTICS | Facility: HOSPITAL | Age: 86
Discharge: HOME | End: 2021-03-03
Payer: COMMERCIAL

## 2021-03-03 DIAGNOSIS — M79.606 LEG PAIN: Primary | ICD-10-CM

## 2021-03-03 DIAGNOSIS — M79.606 LEG PAIN: ICD-10-CM

## 2021-03-03 DIAGNOSIS — M79.89 LEG SWELLING: ICD-10-CM

## 2021-03-03 PROCEDURE — 93970 EXTREMITY STUDY: CPT

## 2021-03-03 PROCEDURE — 93970 EXTREMITY STUDY: CPT | Performed by: SURGERY

## 2021-03-11 ENCOUNTER — IMMUNIZATIONS (OUTPATIENT)
Dept: FAMILY MEDICINE CLINIC | Facility: HOSPITAL | Age: 86
End: 2021-03-11

## 2021-03-11 DIAGNOSIS — Z23 ENCOUNTER FOR IMMUNIZATION: Primary | ICD-10-CM

## 2021-03-11 PROCEDURE — 91301 SARS-COV-2 / COVID-19 MRNA VACCINE (MODERNA) 100 MCG: CPT

## 2021-03-11 PROCEDURE — 0012A SARS-COV-2 / COVID-19 MRNA VACCINE (MODERNA) 100 MCG: CPT

## 2021-08-27 ENCOUNTER — APPOINTMENT (EMERGENCY)
Dept: NON INVASIVE DIAGNOSTICS | Facility: HOSPITAL | Age: 86
DRG: 291 | End: 2021-08-27
Payer: COMMERCIAL

## 2021-08-27 ENCOUNTER — HOSPITAL ENCOUNTER (INPATIENT)
Facility: HOSPITAL | Age: 86
LOS: 3 days | DRG: 291 | End: 2021-08-30
Attending: EMERGENCY MEDICINE | Admitting: FAMILY MEDICINE
Payer: COMMERCIAL

## 2021-08-27 ENCOUNTER — APPOINTMENT (EMERGENCY)
Dept: RADIOLOGY | Facility: HOSPITAL | Age: 86
DRG: 291 | End: 2021-08-27
Payer: COMMERCIAL

## 2021-08-27 DIAGNOSIS — I50.9 CHF (CONGESTIVE HEART FAILURE) (HCC): Primary | ICD-10-CM

## 2021-08-27 DIAGNOSIS — I48.91 NEW ONSET A-FIB (HCC): ICD-10-CM

## 2021-08-27 DIAGNOSIS — R09.02 HYPOXIA: ICD-10-CM

## 2021-08-27 DIAGNOSIS — R60.0 BILATERAL LEG EDEMA: ICD-10-CM

## 2021-08-27 DIAGNOSIS — I63.9 ACUTE STROKE DUE TO ISCHEMIA (HCC): ICD-10-CM

## 2021-08-27 PROBLEM — N17.9 AKI (ACUTE KIDNEY INJURY) (HCC): Status: ACTIVE | Noted: 2021-08-27

## 2021-08-27 PROBLEM — I95.0 IDIOPATHIC HYPOTENSION: Status: ACTIVE | Noted: 2021-08-27

## 2021-08-27 PROBLEM — J96.01 ACUTE RESPIRATORY FAILURE WITH HYPOXIA (HCC): Status: ACTIVE | Noted: 2021-08-27

## 2021-08-27 PROBLEM — I50.31 ACUTE DIASTOLIC CHF (CONGESTIVE HEART FAILURE) (HCC): Status: ACTIVE | Noted: 2021-08-27

## 2021-08-27 LAB
ALBUMIN SERPL BCP-MCNC: 3.5 G/DL (ref 3.5–5)
ALP SERPL-CCNC: 54 U/L (ref 46–116)
ALT SERPL W P-5'-P-CCNC: 13 U/L (ref 12–78)
ANION GAP SERPL CALCULATED.3IONS-SCNC: 10 MMOL/L (ref 4–13)
APTT PPP: 33 SECONDS (ref 23–37)
AST SERPL W P-5'-P-CCNC: 15 U/L (ref 5–45)
BASE EX.OXY STD BLDV CALC-SCNC: 84.2 % (ref 60–80)
BASE EXCESS BLDV CALC-SCNC: -4 MMOL/L
BASOPHILS # BLD AUTO: 0.03 THOUSANDS/ΜL (ref 0–0.1)
BASOPHILS NFR BLD AUTO: 0 % (ref 0–1)
BILIRUB SERPL-MCNC: 0.95 MG/DL (ref 0.2–1)
BUN SERPL-MCNC: 35 MG/DL (ref 5–25)
CALCIUM SERPL-MCNC: 9 MG/DL (ref 8.3–10.1)
CHLORIDE SERPL-SCNC: 104 MMOL/L (ref 100–108)
CO2 SERPL-SCNC: 25 MMOL/L (ref 21–32)
CREAT SERPL-MCNC: 1.62 MG/DL (ref 0.6–1.3)
EOSINOPHIL # BLD AUTO: 0.02 THOUSAND/ΜL (ref 0–0.61)
EOSINOPHIL NFR BLD AUTO: 0 % (ref 0–6)
ERYTHROCYTE [DISTWIDTH] IN BLOOD BY AUTOMATED COUNT: 18 % (ref 11.6–15.1)
GFR SERPL CREATININE-BSD FRML MDRD: 27 ML/MIN/1.73SQ M
GLUCOSE SERPL-MCNC: 125 MG/DL (ref 65–140)
HCO3 BLDV-SCNC: 20.1 MMOL/L (ref 24–30)
HCT VFR BLD AUTO: 40.1 % (ref 34.8–46.1)
HGB BLD-MCNC: 12.9 G/DL (ref 11.5–15.4)
IMM GRANULOCYTES # BLD AUTO: 0.03 THOUSAND/UL (ref 0–0.2)
IMM GRANULOCYTES NFR BLD AUTO: 0 % (ref 0–2)
INR PPP: 1.26 (ref 0.84–1.19)
LACTATE SERPL-SCNC: 1.6 MMOL/L (ref 0.5–2)
LYMPHOCYTES # BLD AUTO: 0.63 THOUSANDS/ΜL (ref 0.6–4.47)
LYMPHOCYTES NFR BLD AUTO: 7 % (ref 14–44)
MAGNESIUM SERPL-MCNC: 2.4 MG/DL (ref 1.6–2.6)
MCH RBC QN AUTO: 30.7 PG (ref 26.8–34.3)
MCHC RBC AUTO-ENTMCNC: 32.2 G/DL (ref 31.4–37.4)
MCV RBC AUTO: 96 FL (ref 82–98)
MONOCYTES # BLD AUTO: 0.59 THOUSAND/ΜL (ref 0.17–1.22)
MONOCYTES NFR BLD AUTO: 6 % (ref 4–12)
NEUTROPHILS # BLD AUTO: 8.19 THOUSANDS/ΜL (ref 1.85–7.62)
NEUTS SEG NFR BLD AUTO: 87 % (ref 43–75)
NRBC BLD AUTO-RTO: 0 /100 WBCS
NT-PROBNP SERPL-MCNC: ABNORMAL PG/ML
O2 CT BLDV-SCNC: 16.3 ML/DL
PCO2 BLDV: 33.7 MM HG (ref 42–50)
PH BLDV: 7.39 [PH] (ref 7.3–7.4)
PLATELET # BLD AUTO: 211 THOUSANDS/UL (ref 149–390)
PMV BLD AUTO: 10.2 FL (ref 8.9–12.7)
PO2 BLDV: 53.7 MM HG (ref 35–45)
POTASSIUM SERPL-SCNC: 4.3 MMOL/L (ref 3.5–5.3)
PROT SERPL-MCNC: 6.5 G/DL (ref 6.4–8.2)
PROTHROMBIN TIME: 15.5 SECONDS (ref 11.6–14.5)
RBC # BLD AUTO: 4.2 MILLION/UL (ref 3.81–5.12)
SODIUM SERPL-SCNC: 139 MMOL/L (ref 136–145)
TROPONIN I SERPL-MCNC: <0.02 NG/ML
WBC # BLD AUTO: 9.49 THOUSAND/UL (ref 4.31–10.16)

## 2021-08-27 PROCEDURE — 85730 THROMBOPLASTIN TIME PARTIAL: CPT | Performed by: PHYSICIAN ASSISTANT

## 2021-08-27 PROCEDURE — 85610 PROTHROMBIN TIME: CPT | Performed by: PHYSICIAN ASSISTANT

## 2021-08-27 PROCEDURE — 36415 COLL VENOUS BLD VENIPUNCTURE: CPT | Performed by: PHYSICIAN ASSISTANT

## 2021-08-27 PROCEDURE — 83605 ASSAY OF LACTIC ACID: CPT | Performed by: PHYSICIAN ASSISTANT

## 2021-08-27 PROCEDURE — 82805 BLOOD GASES W/O2 SATURATION: CPT | Performed by: PHYSICIAN ASSISTANT

## 2021-08-27 PROCEDURE — 96365 THER/PROPH/DIAG IV INF INIT: CPT

## 2021-08-27 PROCEDURE — 83880 ASSAY OF NATRIURETIC PEPTIDE: CPT | Performed by: PHYSICIAN ASSISTANT

## 2021-08-27 PROCEDURE — 83735 ASSAY OF MAGNESIUM: CPT | Performed by: PHYSICIAN ASSISTANT

## 2021-08-27 PROCEDURE — 93970 EXTREMITY STUDY: CPT

## 2021-08-27 PROCEDURE — 71045 X-RAY EXAM CHEST 1 VIEW: CPT

## 2021-08-27 PROCEDURE — 99285 EMERGENCY DEPT VISIT HI MDM: CPT

## 2021-08-27 PROCEDURE — 85025 COMPLETE CBC W/AUTO DIFF WBC: CPT | Performed by: PHYSICIAN ASSISTANT

## 2021-08-27 PROCEDURE — 99223 1ST HOSP IP/OBS HIGH 75: CPT | Performed by: FAMILY MEDICINE

## 2021-08-27 PROCEDURE — 84484 ASSAY OF TROPONIN QUANT: CPT | Performed by: PHYSICIAN ASSISTANT

## 2021-08-27 PROCEDURE — 96366 THER/PROPH/DIAG IV INF ADDON: CPT

## 2021-08-27 PROCEDURE — 80053 COMPREHEN METABOLIC PANEL: CPT | Performed by: PHYSICIAN ASSISTANT

## 2021-08-27 PROCEDURE — 96375 TX/PRO/DX INJ NEW DRUG ADDON: CPT

## 2021-08-27 PROCEDURE — 99285 EMERGENCY DEPT VISIT HI MDM: CPT | Performed by: PHYSICIAN ASSISTANT

## 2021-08-27 PROCEDURE — 93005 ELECTROCARDIOGRAM TRACING: CPT

## 2021-08-27 RX ORDER — FUROSEMIDE 40 MG/1
40 TABLET ORAL DAILY
COMMUNITY
Start: 2021-03-31 | End: 2021-08-30 | Stop reason: HOSPADM

## 2021-08-27 RX ORDER — ONDANSETRON 2 MG/ML
4 INJECTION INTRAMUSCULAR; INTRAVENOUS EVERY 6 HOURS PRN
Status: DISCONTINUED | OUTPATIENT
Start: 2021-08-27 | End: 2021-08-30 | Stop reason: HOSPADM

## 2021-08-27 RX ORDER — DIGOXIN 125 MCG
250 TABLET ORAL ONCE
Status: COMPLETED | OUTPATIENT
Start: 2021-08-27 | End: 2021-08-27

## 2021-08-27 RX ORDER — HEPARIN SODIUM 5000 [USP'U]/ML
5000 INJECTION, SOLUTION INTRAVENOUS; SUBCUTANEOUS EVERY 8 HOURS SCHEDULED
Status: DISCONTINUED | OUTPATIENT
Start: 2021-08-27 | End: 2021-08-28

## 2021-08-27 RX ORDER — EZETIMIBE 10 MG/1
10 TABLET ORAL DAILY
COMMUNITY
End: 2021-08-30 | Stop reason: HOSPADM

## 2021-08-27 RX ORDER — NIFEDIPINE 60 MG/1
60 TABLET, EXTENDED RELEASE ORAL DAILY
COMMUNITY
End: 2021-08-30 | Stop reason: HOSPADM

## 2021-08-27 RX ORDER — EZETIMIBE 10 MG/1
10 TABLET ORAL DAILY
Status: DISCONTINUED | OUTPATIENT
Start: 2021-08-28 | End: 2021-08-29

## 2021-08-27 RX ORDER — ASCORBIC ACID 500 MG
1000 TABLET ORAL DAILY
COMMUNITY
End: 2021-08-30 | Stop reason: HOSPADM

## 2021-08-27 RX ORDER — ALPRAZOLAM 0.25 MG/1
0.25 TABLET ORAL AS NEEDED
COMMUNITY
End: 2021-08-30 | Stop reason: HOSPADM

## 2021-08-27 RX ORDER — MIDODRINE HYDROCHLORIDE 5 MG/1
2.5 TABLET ORAL
Status: DISCONTINUED | OUTPATIENT
Start: 2021-08-27 | End: 2021-08-28

## 2021-08-27 RX ORDER — DILTIAZEM HYDROCHLORIDE 5 MG/ML
10 INJECTION INTRAVENOUS ONCE
Status: DISCONTINUED | OUTPATIENT
Start: 2021-08-27 | End: 2021-08-27

## 2021-08-27 RX ORDER — FUROSEMIDE 10 MG/ML
40 INJECTION INTRAMUSCULAR; INTRAVENOUS ONCE
Status: COMPLETED | OUTPATIENT
Start: 2021-08-27 | End: 2021-08-27

## 2021-08-27 RX ORDER — CALCIUM CARBONATE 200(500)MG
1000 TABLET,CHEWABLE ORAL DAILY PRN
Status: DISCONTINUED | OUTPATIENT
Start: 2021-08-27 | End: 2021-08-29

## 2021-08-27 RX ORDER — FUROSEMIDE 10 MG/ML
20 INJECTION INTRAMUSCULAR; INTRAVENOUS
Status: DISCONTINUED | OUTPATIENT
Start: 2021-08-27 | End: 2021-08-28

## 2021-08-27 RX ORDER — POTASSIUM CHLORIDE 20 MEQ/1
1 TABLET, EXTENDED RELEASE ORAL 2 TIMES DAILY
COMMUNITY
Start: 2021-03-10 | End: 2021-08-30 | Stop reason: HOSPADM

## 2021-08-27 RX ORDER — ACETAMINOPHEN 325 MG/1
650 TABLET ORAL EVERY 6 HOURS PRN
Status: DISCONTINUED | OUTPATIENT
Start: 2021-08-27 | End: 2021-08-30 | Stop reason: HOSPADM

## 2021-08-27 RX ADMIN — FUROSEMIDE 40 MG: 10 INJECTION, SOLUTION INTRAMUSCULAR; INTRAVENOUS at 11:55

## 2021-08-27 RX ADMIN — HEPARIN SODIUM 5000 UNITS: 5000 INJECTION INTRAVENOUS; SUBCUTANEOUS at 18:06

## 2021-08-27 RX ADMIN — DILTIAZEM HYDROCHLORIDE 5 MG/HR: 5 INJECTION INTRAVENOUS at 13:44

## 2021-08-27 RX ADMIN — MIDODRINE HYDROCHLORIDE 2.5 MG: 5 TABLET ORAL at 20:45

## 2021-08-27 RX ADMIN — DIGOXIN 250 MCG: 125 TABLET ORAL at 15:33

## 2021-08-27 RX ADMIN — DIGOXIN 250 MCG: 125 TABLET ORAL at 22:34

## 2021-08-27 NOTE — ASSESSMENT & PLAN NOTE
Patient has new onset AFib with RVR  Blood pressures are low normal   Currently on Cardizem drip 15 mcg however heart rates are still uncontrolled  Continue Cardizem drip and titrate and try to keep systolic blood pressure more than 90  Also receiving digoxin as per Cardiology with a loading dose  Continue telemetry monitoring and will place on level 2 step-down  Patient is DNR/DNI and does not want to be cardioverted  Does not want full strength anticoagulation    Does not want any aggressive or invasive interventions to be done

## 2021-08-27 NOTE — ASSESSMENT & PLAN NOTE
Patient has acute kidney injury with creatinine of 1 6  Previous labs from a few years ago showed normal creatinine of 1  Continue gentle diuresis  Avoid hypotension and nephrotoxic agents

## 2021-08-27 NOTE — ASSESSMENT & PLAN NOTE
Wt Readings from Last 3 Encounters:   08/27/21 49 5 kg (109 lb 2 oz)   Patient has acute CHF exacerbation  No previous 2D echo noted in the system  Was on Lasix at home which was recently being adjusted outpatient by PCP due to worsening leg swelling  Noticed to have anasarca today with 2 to 3+ pitting edema bilateral lower extremity and bilateral pleural effusions  Ordered 2D echo for Monday once rate is better controlled  Received Lasix 40 mg IV x1 in the ER  Will continue Lasix 20 mg IV b i d  Low-dose due to hypotension and try to gently diurese if possible  Please note patient does not want any invasive or aggressive interventions to be done and hence will not consider thoracentesis at this time  Goals of care discussed at length with patient and niece at bedside  Patient will be DNR/DNI with no cardioversion or aggressive invasive interventions to be done  Patient family understand that due to her advanced age and critical condition at this time it is quite possible that she might not survive this episode    Will try medications however if she appears to be decompensating and worsening will discuss about comfort care

## 2021-08-27 NOTE — Clinical Note
Case was discussed with ivon  and the patient's admission status was agreed to be Admission Status: inpatient status to the service of Dr Alva Crocker

## 2021-08-27 NOTE — CONSULTS
Consultation - Cardiology   [facility] 80 y o  female MRN: [id number]  Unit/Bed#: ED 07 Encounter: [id number]    Assessment/Plan     Assessment:  Afib with RVR- unknown duration, started on cardizem drip   Acute on chronic HF unknown EF- elevated bnp, pleural effusions, sob, edema  HTN  HLD  Chadsvasc 4    Plan:  1  Currently on cardizem Drip for rate control titrating as needed pending BP response  2  Discussed anticoagulation  She declines  Discussed possible MAMTA DCCV in the future and pt declines completely  She is agreeable to medications to help her but declines invasive measures  Given this will pursue rate control strategy and avoid antiarrhythmics  3  Continue with diuresis with lasix 40 IV BID, monitor I and O, monitor daily standing weights, monitor renal function and electrolytes  4  Echo to be updated once HR better controlled  5  Start digoxin 250 mcg now and again in 6 h  History of Present Illness   Physician Requesting Consult: Nae Claros,   Reason for Consult / Principal Problem: afib with RVR, Acute HF  HPI: [facility] is a 80y o  year old female with history of HTN, HLD who is here for CHF and new onset afib  Reports that she has had issues with edema for the last 4 months  She had lasix titrated a few different times  She also used compression stockings in the legs which helped  In the last few days she noted her swelling got worse and she noted sob for the first time  On admission her bnp was elevated  She has bilateral pleural effusions noted on cxr  She was noted to be in afib with rvr  She has no prior history of afib  She is not anticoagulated  She has no hx of abnormal bleeding  She reports at home she is very sedentary  People cook and clean for her  She has no history of stroke in the past  She has not had any falls at home  Consults    Review of Systems   Constitutional: Positive for fatigue and unexpected weight change  Negative for chills  Respiratory: Positive for shortness of breath  Negative for chest tightness and wheezing  Cardiovascular: Positive for leg swelling  Negative for chest pain and palpitations  Gastrointestinal: Negative for nausea  Genitourinary: Negative for difficulty urinating  Skin: Negative for color change, pallor and rash  Neurological: Negative for dizziness, syncope and light-headedness  Psychiatric/Behavioral: Negative for agitation, behavioral problems and confusion  Historical Information   Past Medical History:   Diagnosis Date    Hyperlipidemia     Hypertension     Osteoarthritis     Urinary incontinence      Past Surgical History:   Procedure Laterality Date    ADENOIDECTOMY      DILATION AND CURETTAGE OF UTERUS      ORIF FEMUR FRACTURE Right     ORIF ULNAR / RADIAL SHAFT FRACTURE Right     TONSILLECTOMY       Social History     Substance and Sexual Activity   Alcohol Use Never     Social History     Substance and Sexual Activity   Drug Use Never     E-Cigarette/Vaping    E-Cigarette Use Never User      E-Cigarette/Vaping Substances     Social History     Tobacco Use   Smoking Status Never Smoker   Smokeless Tobacco Never Used     Family History: non-contributory    Meds/Allergies   all current active meds have been reviewed  Allergies   Allergen Reactions    Penicillins GI Intolerance       Objective   Vitals: Blood pressure 95/64, pulse (!) 143, temperature 98 3 °F (36 8 °C), temperature source Oral, resp  rate 22, height 4' 9" (1 448 m), weight 49 5 kg (109 lb 2 oz), SpO2 93 %  Orthostatic Blood Pressures      Most Recent Value   Blood Pressure  95/64 filed at 08/27/2021 1415   Patient Position - Orthostatic VS  Lying filed at 08/27/2021 1134          No intake or output data in the 24 hours ending 08/27/21 1457    Invasive Devices     Peripheral Intravenous Line            Peripheral IV 08/27/21 Right Forearm <1 day                Physical Exam  Vitals reviewed     Constitutional: Appearance: Normal appearance  HENT:      Head: Normocephalic and atraumatic  Cardiovascular:      Rate and Rhythm: Rhythm irregular  Heart sounds: No murmur heard  No gallop  Pulmonary:      Effort: Pulmonary effort is normal  No respiratory distress  Breath sounds: Rales present  Abdominal:      Palpations: Abdomen is soft  Musculoskeletal:         General: Swelling present  Cervical back: Neck supple  Skin:     General: Skin is warm and dry  Capillary Refill: Capillary refill takes less than 2 seconds  Neurological:      General: No focal deficit present  Mental Status: She is alert and oriented to person, place, and time  Psychiatric:         Mood and Affect: Mood normal          Thought Content: Thought content normal          Lab Results:   I have personally reviewed pertinent lab results  CBC with diff:   Results from last 7 days   Lab Units 08/27/21  1155   WBC Thousand/uL 9 49   RBC Million/uL 4 20   HEMOGLOBIN g/dL 12 9   HEMATOCRIT % 40 1   MCV fL 96   MCH pg 30 7   MCHC g/dL 32 2   RDW % 18 0*   MPV fL 10 2   PLATELETS Thousands/uL 211     CMP:   Results from last 7 days   Lab Units 08/27/21  1155   SODIUM mmol/L 139   POTASSIUM mmol/L 4 3   CHLORIDE mmol/L 104   CO2 mmol/L 25   BUN mg/dL 35*   CREATININE mg/dL 1 62*   CALCIUM mg/dL 9 0   AST U/L 15   ALT U/L 13   ALK PHOS U/L 54   EGFR ml/min/1 73sq m 27     Troponin:   0   Lab Value Date/Time    TROPONINI <0 02 08/27/2021 1155     BNP:   Results from last 7 days   Lab Units 08/27/21  1155   POTASSIUM mmol/L 4 3   CHLORIDE mmol/L 104   CO2 mmol/L 25   BUN mg/dL 35*   CREATININE mg/dL 1 62*   CALCIUM mg/dL 9 0   EGFR ml/min/1 73sq m 27     Coags:   Results from last 7 days   Lab Units 08/27/21  1237   PTT seconds 33   INR  1 26*     TSH:     Magnesium:   Results from last 7 days   Lab Units 08/27/21  1155   MAGNESIUM mg/dL 2 4     Imaging: I have personally reviewed pertinent reports         cxr reviewed by myself showing bilateral pleural effusions       EKG: afib with rvr on telemetry Ventricular rate 136

## 2021-08-27 NOTE — PROGRESS NOTES
Still in AFib but heart rate in the 80s with blood pressure 80 by 50  Will discontinue Cardizem drip and placed on oral Cardizem with holding parameters  Continue digoxin

## 2021-08-27 NOTE — ED PROVIDER NOTES
History  Chief Complaint   Patient presents with    Shortness of Breath     pt c/o leg swelling for 3-4 days with b/l leg pain and SOB starting today     The patient is a 59-year-old female with a past medical history of congestive heart failure, hypertension who presents emergency department today escorted by her knees for the concern of increasing shortness of breath with lower leg edema over last 3 days  Patient is taking 40 mg of Lasix daily  Patient's her PCP did briefly have this increased to b i d  But however returned to once daily  Patient denies any chest pain, nausea vomiting, dizziness, headache, blurred vision, falls or traumas  Patient denies any recent illnesses or cough or congestion  History provided by:  Patient and relative  History limited by:  Age  Shortness of Breath  Severity:  Moderate  Onset quality:  Unable to specify  Duration:  3 days  Timing:  Constant  Progression:  Worsening  Chronicity:  New  Relieved by:  Nothing  Worsened by:  Coughing and exertion  Ineffective treatments:  None tried  Associated symptoms: no abdominal pain, no chest pain, no claudication, no cough, no diaphoresis, no ear pain, no fever, no headaches, no hemoptysis, no neck pain, no PND, no rash, no sore throat, no sputum production, no swollen glands, no vomiting and no wheezing        Prior to Admission Medications   Prescriptions Last Dose Informant Patient Reported? Taking?    ALPRAZolam (Xanax) 0 25 mg tablet   Yes No   Sig: Take 0 25 mg by mouth as needed   Cholecalciferol 50 MCG (2000 UT) TABS   Yes No   Sig: Take 50 mcg by mouth daily   NIFEdipine (Procardia XL) 60 mg 24 hr tablet 8/27/2021 at Unknown time  Yes Yes   Sig: Take 60 mg by mouth daily   ascorbic acid (VITAMIN C) 500 MG tablet   Yes No   Sig: Take 1,000 mg by mouth daily   ezetimibe (Zetia) 10 mg tablet   Yes No   Sig: Take 10 mg by mouth daily   furosemide (LASIX) 40 mg tablet   Yes Yes   Sig: Take 40 mg by mouth daily   potassium chloride (Klor-Con M20) 20 mEq tablet   Yes Yes   Sig: Take 1 tablet by mouth 2 (two) times a day 1230 and 1930      Facility-Administered Medications: None       Past Medical History:   Diagnosis Date    [facility])     Hyperlipidemia     Hypertension     Osteoarthritis     Urinary incontinence        Past Surgical History:   Procedure Laterality Date    ADENOIDECTOMY      DILATION AND CURETTAGE OF UTERUS      ORIF FEMUR FRACTURE Right     ORIF ULNAR / RADIAL SHAFT FRACTURE Right     TONSILLECTOMY         Family History   Problem Relation Age of Onset    Hypertension Mother      I have reviewed and agree with the history as documented  E-Cigarette/Vaping    E-Cigarette Use Never User      E-Cigarette/Vaping Substances     Social History     Tobacco Use    Smoking status: Never Smoker    Smokeless tobacco: Never Used   Vaping Use    Vaping Use: Never used   Substance Use Topics    Alcohol use: Never    Drug use: Never       Review of Systems   Constitutional: Negative for chills, diaphoresis and fever  HENT: Negative for ear pain and sore throat  Eyes: Negative for pain and visual disturbance  Respiratory: Positive for shortness of breath  Negative for cough, hemoptysis, sputum production and wheezing  Cardiovascular: Negative for chest pain, palpitations, claudication and PND  Gastrointestinal: Negative for abdominal pain and vomiting  Genitourinary: Negative for dysuria and hematuria  Musculoskeletal: Negative for arthralgias, back pain and neck pain  Skin: Negative for color change and rash  Neurological: Negative for seizures, syncope and headaches  All other systems reviewed and are negative  Physical Exam  Physical Exam  Vitals and nursing note reviewed  Constitutional:       General: She is not in acute distress  Appearance: She is well-developed  HENT:      Head: Normocephalic and atraumatic        Mouth/Throat:      Mouth: Mucous membranes are moist    Eyes:      Extraocular Movements: Extraocular movements intact  Conjunctiva/sclera: Conjunctivae normal       Pupils: Pupils are equal, round, and reactive to light  Cardiovascular:      Rate and Rhythm: Tachycardia present  Rhythm irregular  Pulses: Normal pulses  Heart sounds: No murmur heard  Pulmonary:      Effort: Pulmonary effort is normal  No respiratory distress  Breath sounds: Examination of the right-lower field reveals rales  Examination of the left-lower field reveals rales  Rales present  Chest:      Chest wall: No edema  Abdominal:      Palpations: Abdomen is soft  Tenderness: There is no abdominal tenderness  Musculoskeletal:      Cervical back: Neck supple  Right lower leg: No tenderness  Edema present  Left lower leg: No tenderness  Edema present  Skin:     General: Skin is warm and dry  Capillary Refill: Capillary refill takes less than 2 seconds  Neurological:      General: No focal deficit present  Mental Status: She is alert and oriented to person, place, and time           Vital Signs  ED Triage Vitals   Temperature Pulse Respirations Blood Pressure SpO2   08/27/21 1130 08/27/21 1134 08/27/21 1134 08/27/21 1134 08/27/21 1134   98 3 °F (36 8 °C) (!) 145 19 108/70 (!) 88 %      Temp Source Heart Rate Source Patient Position - Orthostatic VS BP Location FiO2 (%)   08/27/21 1130 08/27/21 1134 08/27/21 1134 08/27/21 1134 --   Oral Monitor Lying Left arm       Pain Score       08/27/21 1134       3           Vitals:    08/27/21 1533 08/27/21 1545 08/27/21 1707 08/27/21 1719   BP:  112/63 (!) 85/66 95/55   Pulse: (!) 117 104 83 83   Patient Position - Orthostatic VS:   Lying          Visual Acuity      ED Medications  Medications   diltiazem (CARDIZEM) 125 mg in sodium chloride 0 9 % 125 mL infusion (12 5 mg/hr Intravenous Rate/Dose Change 8/27/21 1710)   digoxin (LANOXIN) tablet 250 mcg (has no administration in time range)   ezetimibe (ZETIA) tablet 10 mg (has no administration in time range)   acetaminophen (TYLENOL) tablet 650 mg (has no administration in time range)   ondansetron (ZOFRAN) injection 4 mg (has no administration in time range)   calcium carbonate (TUMS) chewable tablet 1,000 mg (has no administration in time range)   heparin (porcine) subcutaneous injection 5,000 Units (has no administration in time range)   furosemide (LASIX) injection 20 mg (has no administration in time range)   furosemide (LASIX) injection 40 mg (40 mg Intravenous Given 8/27/21 1155)   digoxin (LANOXIN) tablet 250 mcg (250 mcg Oral Given 8/27/21 1533)       Diagnostic Studies  Results Reviewed     Procedure Component Value Units Date/Time    APTT [[id number]]  (Normal) Collected: 08/27/21 1237    Lab Status: Final result Specimen: Blood from Arm, Right Updated: 08/27/21 1304     PTT 33 seconds     Protime-INR [[id number]]  (Abnormal) Collected: 08/27/21 1237    Lab Status: Final result Specimen: Blood from Arm, Right Updated: 08/27/21 1304     Protime 15 5 seconds      INR 1 26    NT-BNP PRO [[id number]]  (Abnormal) Collected: 08/27/21 1155    Lab Status: Final result Specimen: Blood from Arm, Right Updated: 08/27/21 1257     NT-proBNP 32,826 pg/mL     Blood gas, venous [[id number]]  (Abnormal) Collected: 08/27/21 1155    Lab Status: Final result Specimen: Blood from Arm, Right Updated: 08/27/21 1246     pH, Sebastian 7 393     pCO2, Sebastian 33 7 mm Hg      pO2, Sebastian 53 7 mm Hg      HCO3, Sebastian 20 1 mmol/L      Base Excess, Sebastian -4 0 mmol/L      O2 Content, Sebastian 16 3 ml/dL      O2 HGB, VENOUS 84 2 %     Magnesium [[id number]]  (Normal) Collected: 08/27/21 1155    Lab Status: Final result Specimen: Blood from Arm, Right Updated: 08/27/21 1239     Magnesium 2 4 mg/dL     Lactic acid [[id number]]  (Normal) Collected: 08/27/21 1155    Lab Status: Final result Specimen: Blood from Arm, Right Updated: 08/27/21 1231     LACTIC ACID 1 6 mmol/L     Narrative: Result may be elevated if tourniquet was used during collection      Troponin I [[id number]]  (Normal) Collected: 08/27/21 1155    Lab Status: Final result Specimen: Blood from Arm, Right Updated: 08/27/21 1231     Troponin I <0 02 ng/mL     Comprehensive metabolic panel [[id number]]  (Abnormal) Collected: 08/27/21 1155    Lab Status: Final result Specimen: Blood from Arm, Right Updated: 08/27/21 1226     Sodium 139 mmol/L      Potassium 4 3 mmol/L      Chloride 104 mmol/L      CO2 25 mmol/L      ANION GAP 10 mmol/L      BUN 35 mg/dL      Creatinine 1 62 mg/dL      Glucose 125 mg/dL      Calcium 9 0 mg/dL      AST 15 U/L      ALT 13 U/L      Alkaline Phosphatase 54 U/L      Total Protein 6 5 g/dL      Albumin 3 5 g/dL      Total Bilirubin 0 95 mg/dL      eGFR 27 ml/min/1 73sq m     Narrative:      National Kid[employer] Disease Foundation guidelines for Chronic Kidney Disease (CKD):     Stage 1 with normal or high GFR (GFR > 90 mL/min/1 73 square meters)    Stage 2 Mild CKD (GFR = 60-89 mL/min/1 73 square meters)    Stage 3A Moderate CKD (GFR = 45-59 mL/min/1 73 square meters)    Stage 3B Moderate CKD (GFR = 30-44 mL/min/1 73 square meters)    Stage 4 Severe CKD (GFR = 15-29 mL/min/1 73 square meters)    Stage 5 End Stage CKD (GFR <15 mL/min/1 73 square meters)  Note: GFR calculation is accurate only with a steady state creatinine    CBC and differential [[id number]]  (Abnormal) Collected: 08/27/21 1155    Lab Status: Final result Specimen: Blood from Arm, Right Updated: 08/27/21 1206     WBC 9 49 Thousand/uL      RBC 4 20 Million/uL      Hemoglobin 12 9 g/dL      Hematocrit 40 1 %      MCV 96 fL      MCH 30 7 pg      MCHC 32 2 g/dL      RDW 18 0 %      MPV 10 2 fL      Platelets 459 Thousands/uL      nRBC 0 /100 WBCs      Neutrophils Relative 87 %      Immat GRANS % 0 %      Lymphocytes Relative 7 %      Monocytes Relative 6 %      Eosinophils Relative 0 %      Basophils Relative 0 %      Neutrophils Absolute 8 19 Thousands/µL      Immature Grans Absolute 0 03 Thousand/uL      Lymphocytes Absolute 0 63 Thousands/µL      Monocytes Absolute 0 59 Thousand/µL      Eosinophils Absolute 0 02 Thousand/µL      Basophils Absolute 0 03 Thousands/µL                  XR chest 1 view portable   Final Result by Chetna Marte MD (08/27 1535)      Cardiomegaly with bilateral pleural effusions and probable bibasilar atelectasis most likely on the basis of congestive heart failure  Workstation performed: [id number]         VAS lower limb venous duplex study, complete bilateral    (Results Pending)              Procedures  ECG 12 Lead Documentation Only    Date/Time: 8/27/2021 5:50 PM  Performed by: Lisa Alvarenga PA-C  Authorized by: Lisa Alvarenga PA-C     Indications / Diagnosis:  Sob  ECG reviewed by me, the ED Provider: yes    Patient location:  ED  Previous ECG:     Previous ECG:  Unavailable  Interpretation:     Interpretation: abnormal    Rate:     ECG rate:  135    ECG rate assessment: tachycardic    Rhythm:     Rhythm: atrial fibrillation               ED Course  ED Course as of Aug 27 1752   Fri Aug 27, 2021   1310 NT-proBNP(!): 32,826   1319 Speaking with Cardiology Dr Mela Gaines about the patient's hypotension, AFib with RVR 150s    Recommendation currently would be to start Cardizem drip, will try this now      1400 Recommendation from Cardiology was to place patient on Cardizem drip will start this now, discussed with hospitalist service recommendation was critical care, speaking with critical care      1411 ICU and hospitalist will wait in our to observe the patient's improvement on the Cardizem drip prior to admission to either ICU versus Medicine Service                                              MDM  Number of Diagnoses or Management Options  CHF (congestive heart failure) ([facility] [address] )  Hypoxia  New onset a-fib ([facility] [address] )  Diagnosis management comments: Patient does not wear oxygen at home and upon arrival was 88% on room air was placed on 2 L nasal cannula oxygen  Patient had elevated proBNP and was in obvious decompensated heart failure  Patient was in AFib RVR and was hypotensive  Cardiology did review the case and consult  Recommendation was a Cardizem drip  Patient was asymptomatic with her AFib  Patient had been evaluated by ICU and recommendation was Med surge on step-down two  Patient at the time of admission did not wish for anything interventional to be done with her AFib only wanted medications at this time    Admission to hospitalist       Amount and/or Complexity of Data Reviewed  Clinical lab tests: reviewed and ordered  Tests in the radiology section of CPT®: ordered and reviewed  Decide to obtain previous medical records or to obtain history from someone other than the patient: yes  Review and summarize past medical records: yes  Discuss the patient with other providers: yes  Independent visualization of images, tracings, or specimens: yes    Risk of Complications, Morbidity, and/or Mortality  Presenting problems: high  Diagnostic procedures: high  Management options: high    Patient Progress  Patient progress: stable      Disposition  Final diagnoses:   CHF (congestive heart failure) ([place] [address] )   New onset a-fib ([place] [address] )   Hypoxia     Time reflects when diagnosis was documented in both MDM as applicable and the Disposition within this note     Time User Action Codes Description Comment    8/27/2021 12:04 PM Isabell Santiago Add [I48 91] A-fib ([place] [address] )     8/27/2021 12:04 PM Isabell Santiago Remove [I48 91] A-fib ([facility] )     8/27/2021 12:04 PM Isabell Santiago Add [I50 9] CHF (congestive heart failure) ([place] [address] )     8/27/2021 12:04 PM Isabell Santiago Add [I48 91] New onset a-fib ([place] [address] )     8/27/2021 12:33 PM Papa Dudley Add [R60 0] Bilateral leg edema     8/27/2021  3:32 PM Isabell Santiago Add [R09 02] Hypoxia       ED Disposition     ED Disposition Condition Date/Time Comment    Admit Stable Fri Aug 27, 2021  3:33 PM Case was discussed with eusebio del valle naeem  and the patient's admission status was agreed to be Admission Status: inpatient status to the service of Dr Benito Morelos    None         Current Discharge Medication List      CONTINUE these medications which have NOT CHANGED    Details   furosemide (LASIX) 40 mg tablet Take 40 mg by mouth daily      NIFEdipine (Procardia XL) 60 mg 24 hr tablet Take 60 mg by mouth daily      potassium chloride (Klor-Con M20) 20 mEq tablet Take 1 tablet by mouth 2 (two) times a day 1230 and 1930      ALPRAZolam (Xanax) 0 25 mg tablet Take 0 25 mg by mouth as needed      ascorbic acid (VITAMIN C) 500 MG tablet Take 1,000 mg by mouth daily      Cholecalciferol 50 MCG (2000 UT) TABS Take 50 mcg by mouth daily      ezetimibe (Zetia) 10 mg tablet Take 10 mg by mouth daily           No discharge procedures on file      PDMP Review     None          ED Provider  Electronically Signed by           Georgia Powell PA-C  08/27/21 4622

## 2021-08-27 NOTE — ASSESSMENT & PLAN NOTE
Patient has acute respiratory failure with hypoxia secondary to acute CHF exacerbation    Continue oxygen via nasal cannula to maintain pulse ox more than 92% and placed on gentle diuresis limited by hypotension

## 2021-08-27 NOTE — H&P
[address] 10/7/1924, 80 y o  female MRN: [id number]  Unit/Bed#: ED 07 Encounter: [id number]  Primary Care Provider: Nyla Cope DO   Date and time admitted to hospital: 8/27/2021 11:24 AM    * Acute respiratory failure with hypoxia [facility])  Assessment & Plan  Patient has acute respiratory failure with hypoxia secondary to acute CHF exacerbation  Continue oxygen via nasal cannula to maintain pulse ox more than 92% and placed on gentle diuresis limited by hypotension    Acute exacerbation of CHF (congestive heart failure) (HCC)  Assessment & Plan  Wt Readings from Last 3 Encounters:   08/27/21 49 5 kg (109 lb 2 oz)   Patient has acute CHF exacerbation  No previous 2D echo noted in the system  Was on Lasix at home which was recently being adjusted outpatient by PCP due to worsening leg swelling  Noticed to have anasarca today with 2 to 3+ pitting edema bilateral lower extremity and bilateral pleural effusions  Ordered 2D echo for Monday once rate is better controlled  Received Lasix 40 mg IV x1 in the ER  Will continue Lasix 20 mg IV b i d  Low-dose due to hypotension and try to gently diurese if possible  Please note patient does not want any invasive or aggressive interventions to be done and hence will not consider thoracentesis at this time  Goals of care discussed at length with patient and niece at bedside  Patient will be DNR/DNI with no cardioversion or aggressive invasive interventions to be done  Patient family understand that due to her advanced age and critical condition at this time it is quite possible that she might not survive this episode  Will try medications however if she appears to be decompensating and worsening will discuss about comfort care          Atrial fibrillation with RVR [facility])  Assessment & Plan  Patient has new onset AFib with RVR    Blood pressures are low normal   Currently on Cardizem drip 15 mcg however heart rates are still uncontrolled  Continue Cardizem drip and titrate and try to keep systolic blood pressure more than 90  Also receiving digoxin as per Cardiology with a loading dose  Continue telemetry monitoring and will place on level 2 step-down  Patient is DNR/DNI and does not want to be cardioverted  Does not want full strength anticoagulation  Does not want any aggressive or invasive interventions to be done    BEATRICE (acute kidney injury) [facility])  Assessment & Plan  Patient has acute kidney injury with creatinine of 1 6  Previous labs from a few years ago showed normal creatinine of 1  Continue gentle diuresis  Avoid hypotension and nephrotoxic agents  Idiopathic hypotension  Assessment & Plan  Secondary to CHF exacerbation and AFib with RVR  VTE Prophylaxis: Heparin  / sequential compression device   Code Status:  DNR/DNI  POLST: There is no POLST form on file for this patient (pre-hospital)  Discussion with family:  Discussed with niece    Anticipated Length of Stay:  Patient will be admitted on an Inpatient basis with an anticipated length of stay of  more than 2 midnights  Justification for Hospital Stay:  Acute respiratory failure with hypoxia    Total Time for Visit, including Counseling / Coordination of Care: 60 minutes  Greater than 50% of this total time spent on direct patient counseling and coordination of care  Chief Complaint:   Difficulty breathing and worsening leg swelling    History of Present Illness:    Maylin Moser is a 80 y o  female who presents with difficulty breathing and worsening leg swelling  Patient states that for the last few months she has been having worsening leg swelling  for the last 2-3 days having worsening shortness of breath and feeling uncomfortable  Denies any chest pain or palpitations  Known prior history of AFib  Denies any falls  Denies any fevers or chills or sick contacts      Review of Systems:    Review of Systems   Constitutional: Positive for appetite change and fatigue  Negative for chills and fever  HENT: Negative for hearing loss, sore throat and trouble swallowing  Eyes: Negative for photophobia, discharge and visual disturbance  Respiratory: Positive for shortness of breath  Negative for chest tightness  Cardiovascular: Positive for leg swelling  Negative for chest pain and palpitations  Gastrointestinal: Negative for abdominal pain, blood in stool and vomiting  Endocrine: Negative for polydipsia and polyuria  Genitourinary: Negative for difficulty urinating, dysuria, flank pain and hematuria  Musculoskeletal: Negative for back pain and gait problem  Skin: Negative for rash  Allergic/Immunologic: Negative for environmental allergies and food allergies  Neurological: Negative for dizziness, seizures, syncope and headaches  Hematological: Does not bruise/bleed easily  Psychiatric/Behavioral: Negative for behavioral problems  The patient is nervous/anxious  All other systems reviewed and are negative  Past Medical and Surgical History:     Past Medical History:   Diagnosis Date    [facility]F [facility]congestive [facility] failure) ([facility] )     Hyperlipidemia     Hypertension     Osteoarthritis     Urinary incontinence        Past Surgical History:   Procedure Laterality Date    ADENOIDECTOMY      DILATION AND CURETTAGE OF UTERUS      ORIF FEMUR FRACTURE Right     ORIF ULNAR / RADIAL SHAFT FRACTURE Right     TONSILLECTOMY         Meds/Allergies:    Prior to Admission medications    Medication Sig Start Date End Date Taking?  Authorizing Provider   furosemide (LASIX) 40 mg tablet Take 40 mg by mouth daily 3/31/21  Yes Historical Provider, MD   NIFEdipine (Procardia XL) 60 mg 24 hr tablet Take 60 mg by mouth daily   Yes Historical Provider, MD   potassium chloride (Klor-Con M20) 20 mEq tablet Take 1 tablet by mouth 2 (two) times a day 1230 and 1930 3/10/21  Yes Historical Provider, MD   ALPRAZolam (Xanax) 0 25 mg tablet Take 0 25 mg by mouth as needed    Historical Provider, MD   ascorbic acid (VITAMIN C) 500 MG tablet Take 1,000 mg by mouth daily    Historical Provider, MD   Cholecalciferol 50 MCG (2000 UT) TABS Take 50 mcg by mouth daily    Historical Provider, MD   ezetimibe (Zetia) 10 mg tablet Take 10 mg by mouth daily    Historical Provider, MD     I have reviewed home medications with patient personally  Allergies: Allergies   Allergen Reactions    Penicillins GI Intolerance       Social History:     Marital Status: /Civil Union   Social History     Substance and Sexual Activity   Alcohol Use Never     Social History     Tobacco Use   Smoking Status Never Smoker   Smokeless Tobacco Never Used     Social History     Substance and Sexual Activity   Drug Use Never       Family History:    Family History   Problem Relation Age of Onset    Hypertension Mother        Physical Exam:     Vitals:   Blood Pressure: 94/62 (08/27/21 1500)  Pulse: (!) 117 (08/27/21 1533)  Temperature: 98 3 °F (36 8 °C) (08/27/21 1130)  Temp Source: Oral (08/27/21 1130)  Respirations: 22 (08/27/21 1500)  Height: 4' 9" (144 8 cm) (08/27/21 1134)  Weight - Scale: 49 5 kg (109 lb 2 oz) (08/27/21 1134)  SpO2: 93 % (08/27/21 1500)    Physical Exam  Vitals and nursing note reviewed  Constitutional:       Appearance: She is ill-appearing  Comments: Frail elderly female with moderate generalized muscle wasting noted   HENT:      Head: Normocephalic and atraumatic  Right Ear: External ear normal       Left Ear: External ear normal       Nose: Nose normal       Mouth/Throat:      Pharynx: Oropharynx is clear  Eyes:      Pupils: Pupils are equal, round, and reactive to light  Cardiovascular:      Rate and Rhythm: Tachycardia present  Rhythm irregular  Heart sounds: Normal heart sounds  Pulmonary:      Effort: Respiratory distress present  Breath sounds: Rales present        Comments: Moderate air entry bilaterally with diminished breath sounds mid to lower chest bilaterally  Abdominal:      General: Bowel sounds are normal       Palpations: Abdomen is soft  Tenderness: There is no abdominal tenderness  Musculoskeletal:         General: Normal range of motion  Cervical back: Normal range of motion and neck supple  Right lower leg: Edema present  Left lower leg: Edema present  Comments: Two to 3+ pitting edema bilateral lower extremity   Skin:     General: Skin is warm and dry  Capillary Refill: Capillary refill takes less than 2 seconds  Neurological:      General: No focal deficit present  Mental Status: She is alert and oriented to person, place, and time  Psychiatric:         Mood and Affect: Mood normal              Additional Data:     Lab Results: I have personally reviewed pertinent reports  Results from last 7 days   Lab Units 08/27/21  1155   WBC Thousand/uL 9 49   HEMOGLOBIN g/dL 12 9   HEMATOCRIT % 40 1   PLATELETS Thousands/uL 211   NEUTROS PCT % 87*   LYMPHS PCT % 7*   MONOS PCT % 6   EOS PCT % 0     Results from last 7 days   Lab Units 08/27/21  1155   SODIUM mmol/L 139   POTASSIUM mmol/L 4 3   CHLORIDE mmol/L 104   CO2 mmol/L 25   BUN mg/dL 35*   CREATININE mg/dL 1 62*   ANION GAP mmol/L 10   CALCIUM mg/dL 9 0   ALBUMIN g/dL 3 5   TOTAL BILIRUBIN mg/dL 0 95   ALK PHOS U/L 54   ALT U/L 13   AST U/L 15   GLUCOSE RANDOM mg/dL 125     Results from last 7 days   Lab Units 08/27/21  1237   INR  1 26*             Results from last 7 days   Lab Units 08/27/21  1155   LACTIC ACID mmol/L 1 6       Imaging: I have personally reviewed pertinent reports  XR chest 1 view portable   Final Result by Samm Singh MD (08/27 9085)      Cardiomegaly with bilateral pleural effusions and probable bibasilar atelectasis most likely on the basis of congestive heart failure                       Workstation performed: [id number]         VAS lower limb venous duplex study, complete bilateral    (Results Pending)       EKG, Pathology, and Other Studies Reviewed on Admission:   · EKG:  AFib with RVR    Allscripts / Epic Records Reviewed: Yes     ** Please Note: This note has been constructed using a voice recognition system   **

## 2021-08-28 LAB
ANION GAP SERPL CALCULATED.3IONS-SCNC: 6 MMOL/L (ref 4–13)
BUN SERPL-MCNC: 35 MG/DL (ref 5–25)
CALCIUM SERPL-MCNC: 8.8 MG/DL (ref 8.3–10.1)
CHLORIDE SERPL-SCNC: 105 MMOL/L (ref 100–108)
CO2 SERPL-SCNC: 26 MMOL/L (ref 21–32)
CREAT SERPL-MCNC: 1.67 MG/DL (ref 0.6–1.3)
DIGOXIN SERPL-MCNC: 2 NG/ML (ref 0.8–2)
ERYTHROCYTE [DISTWIDTH] IN BLOOD BY AUTOMATED COUNT: 17.8 % (ref 11.6–15.1)
GFR SERPL CREATININE-BSD FRML MDRD: 26 ML/MIN/1.73SQ M
GLUCOSE SERPL-MCNC: 94 MG/DL (ref 65–140)
HCT VFR BLD AUTO: 36.7 % (ref 34.8–46.1)
HGB BLD-MCNC: 12 G/DL (ref 11.5–15.4)
MAGNESIUM SERPL-MCNC: 2.2 MG/DL (ref 1.6–2.6)
MCH RBC QN AUTO: 30.6 PG (ref 26.8–34.3)
MCHC RBC AUTO-ENTMCNC: 32.7 G/DL (ref 31.4–37.4)
MCV RBC AUTO: 94 FL (ref 82–98)
PLATELET # BLD AUTO: 188 THOUSANDS/UL (ref 149–390)
PMV BLD AUTO: 10 FL (ref 8.9–12.7)
POTASSIUM SERPL-SCNC: 4.2 MMOL/L (ref 3.5–5.3)
RBC # BLD AUTO: 3.92 MILLION/UL (ref 3.81–5.12)
SODIUM SERPL-SCNC: 137 MMOL/L (ref 136–145)
TSH SERPL DL<=0.05 MIU/L-ACNC: 0.72 UIU/ML (ref 0.36–3.74)
WBC # BLD AUTO: 7.86 THOUSAND/UL (ref 4.31–10.16)

## 2021-08-28 PROCEDURE — 84443 ASSAY THYROID STIM HORMONE: CPT | Performed by: FAMILY MEDICINE

## 2021-08-28 PROCEDURE — 83735 ASSAY OF MAGNESIUM: CPT | Performed by: FAMILY MEDICINE

## 2021-08-28 PROCEDURE — 99233 SBSQ HOSP IP/OBS HIGH 50: CPT | Performed by: FAMILY MEDICINE

## 2021-08-28 PROCEDURE — 80162 ASSAY OF DIGOXIN TOTAL: CPT | Performed by: FAMILY MEDICINE

## 2021-08-28 PROCEDURE — 80048 BASIC METABOLIC PNL TOTAL CA: CPT | Performed by: FAMILY MEDICINE

## 2021-08-28 PROCEDURE — 93970 EXTREMITY STUDY: CPT | Performed by: SURGERY

## 2021-08-28 PROCEDURE — 85027 COMPLETE CBC AUTOMATED: CPT | Performed by: FAMILY MEDICINE

## 2021-08-28 RX ORDER — MIDODRINE HYDROCHLORIDE 5 MG/1
5 TABLET ORAL
Status: DISCONTINUED | OUTPATIENT
Start: 2021-08-28 | End: 2021-08-28

## 2021-08-28 RX ORDER — MIDODRINE HYDROCHLORIDE 5 MG/1
5 TABLET ORAL
Status: DISCONTINUED | OUTPATIENT
Start: 2021-08-28 | End: 2021-08-29

## 2021-08-28 RX ORDER — FUROSEMIDE 10 MG/ML
20 INJECTION INTRAMUSCULAR; INTRAVENOUS
Status: DISCONTINUED | OUTPATIENT
Start: 2021-08-28 | End: 2021-08-29

## 2021-08-28 RX ADMIN — FUROSEMIDE 20 MG: 10 INJECTION, SOLUTION INTRAMUSCULAR; INTRAVENOUS at 17:02

## 2021-08-28 RX ADMIN — METOPROLOL TARTRATE 25 MG: 25 TABLET, FILM COATED ORAL at 08:45

## 2021-08-28 RX ADMIN — MIDODRINE HYDROCHLORIDE 5 MG: 5 TABLET ORAL at 09:46

## 2021-08-28 RX ADMIN — APIXABAN 2.5 MG: 2.5 TABLET, FILM COATED ORAL at 17:02

## 2021-08-28 RX ADMIN — FUROSEMIDE 20 MG: 10 INJECTION, SOLUTION INTRAMUSCULAR; INTRAVENOUS at 13:38

## 2021-08-28 RX ADMIN — FUROSEMIDE 20 MG: 10 INJECTION, SOLUTION INTRAMUSCULAR; INTRAVENOUS at 08:45

## 2021-08-28 RX ADMIN — HEPARIN SODIUM 5000 UNITS: 5000 INJECTION INTRAVENOUS; SUBCUTANEOUS at 00:09

## 2021-08-28 RX ADMIN — APIXABAN 2.5 MG: 2.5 TABLET, FILM COATED ORAL at 11:29

## 2021-08-28 RX ADMIN — METOPROLOL TARTRATE 25 MG: 25 TABLET, FILM COATED ORAL at 21:16

## 2021-08-28 RX ADMIN — DILTIAZEM HYDROCHLORIDE 30 MG: 30 TABLET, FILM COATED ORAL at 00:09

## 2021-08-28 RX ADMIN — EZETIMIBE 10 MG: 10 TABLET ORAL at 09:30

## 2021-08-28 RX ADMIN — HEPARIN SODIUM 5000 UNITS: 5000 INJECTION INTRAVENOUS; SUBCUTANEOUS at 08:45

## 2021-08-28 RX ADMIN — MIDODRINE HYDROCHLORIDE 5 MG: 5 TABLET ORAL at 17:00

## 2021-08-28 NOTE — ASSESSMENT & PLAN NOTE
Wt Readings from Last 3 Encounters:   08/28/21 50 2 kg (110 lb 10 7 oz)   Patient has acute CHF exacerbation  No previous 2D echo noted in the system  Was on Lasix at home which was recently being adjusted outpatient by PCP due to worsening leg swelling  Noticed to have anasarca today with 2 to 3+ pitting edema bilateral lower extremity and bilateral pleural effusions  Ordered 2D echo for Monday once rate is better controlled  Received Lasix 40 mg IV x1 in the ER  Will continue Lasix 20 mg IV b i d  Low-dose due to hypotension and try to gently diurese if possible  Use midodrine to prevent hypotension  Please note patient does not want any invasive or aggressive interventions to be done and hence will not consider thoracentesis at this time or pressors or cardioversion    Goals of care discussed at length with patient and niece at bedside  Patient will be DNR/DNI with no cardioversion or aggressive invasive interventions to be done  Patient family understand that due to her advanced age and critical condition at this time it is quite possible that she might not survive this episode    Will try medications however if she appears to be decompensating and worsening will discuss about comfort care

## 2021-08-28 NOTE — CASE MANAGEMENT
Case Management Assessment & Discharge Planning Note    Patient name Jose Rafael Fernando  Location /-01 MRN [id number]  : 10/7/1924 Date 2021       Current Admission Date: 2021  Current Admission Diagnosis:  Acute respiratory failure with hypoxia [facility])   Patient Active Problem List   Diagnosis    Acute respiratory failure with hypoxia (HCC)    Acute exacerbation of CHF (congestive heart failure) ([facility] [address] )    [facility]    Idiopathic hypotension    BEATRICE (acute kidney injury) ([facility] )    Previous Admission - Discharge Date:    LOS (days): 1  Geometric Mean LOS (GMLOS) (days):   Days to GMLOS: Previous Discharge Diagnosis:  No discharge information exists for this patient  Risk of Unplanned Readmission Score  Predictive Model Details          12 (Low)  Factor Value    Calculated 2021 12:03 16% Number of active Rx orders 15    Risk of Unplanned Readmission Model 14% ECG/EKG order present in last 6 months     12% Latest BUN high (35 mg/dL)     12% Age 89     11% Latest INR high (1 26)     10% Imaging order present in last 6 months     8% Number of ED visits in last six months 1     7% Active anticoagulant Rx order present     7% Latest creatinine high (1 67 mg/dL)     4% Charlson Comorbidity Index 2     1% Current length of stay 0 855 days         Admit with HF  New Afib- Cardiology consulted, on  lasix BID IV     CM met with patient at the bedside,baseline information  was obtained  CM discussed the role of CM in helping the patient develop a discharge plan and assist the patient in carry out their plan  OBJECTIVE:  Pt is a 80y o  year old /Civil Union, white or  [1], female with [employer] preference of [employer] [address] admitted on  11:24 AM  Pt is admitted to -[address] at [address] with complaints of Acute respiratory failure with hypoxia ([facility] )     Current admission status: Inpatient  Referral Reason: (dc planning)    [facility]   [facility]  [address] [facility] [address]  Phone: [phone] Fax: [phone]    Primary Care Provider: Kathie Gross DO    Primary Insurance: Matheiu Hastings [facility] REP  Secondary Insurance:     ASSESSMENT:  Active Health Care Agents    There are no active Health Care Agents on file  Advance Directives  Does patient have a [address]?: Yes  Does patient currently have a Health Care decision maker?: Yes, please see Health Care Proxy section (Kelly rosario is the POA/Primary -- [phone])  Does patient have Advance Directives? :  (unsure)         [address] of Residence: [facility]         Patient Information  Mental Status: Alert  During Assessment patient was accompanied by: Not accompanied during assessment  Assessment information provided by[de-identified] Patient  Primary Caregiver: Self  Support Systems:  (Family members, has Home Aides Waiver 3 times 2 hours aweek)  What city do you live in?: [address] [facility] entry access options   Select all that apply : Stairs  Number of steps to enter home : 1  Type of Current Residence: [facility]  Living Arrangements: Lives Alone  Is patient a ?: No    Activities of Daily Living Prior to Admission  Functional Status: Independent  Completes ADLs independently?: No  Level of ADL dependence: Assistance  Ambulates independently?: No  Level of ambulatory dependence: Assistance  Does patient use assisted devices?: Yes  Assisted Devices (DME) used: Sarah Murillo  Does patient currently own DME?: Yes  What DME does the patient currently own?: Sarah Murillo  Does patient have a history of Outpatient Therapy (PT/OT)?: Yes  Does the patient have a history of Short-Term Rehab?: No  Does patient currently have [facility]?: No    - note patient has compression device at home and uses TID      Current Home Health Care  Type of Current Home Care Services: Home health aide (Waiver Services 2 hours -- 3 times a week)    Patient Information Continued  Income Source: Pension/USP  Does patient have prescription coverage?: Yes  Does patient receive dialysis treatments?: No  Does patient have a history of substance abuse?: No  Does patient have a history of Mental Health Diagnosis?: No         Means of Transportation  Means of Transport to Appts[de-identified] Family transport    DISCHARGE DETAILS:    Discharge planning discussed with[de-identified] pt  Freedom of Choice: Yes         [address]         Is the patient interested in [facility] at discharge?: No    DME Referral Provided  Referral made for DME?: No                 Transportation at Discharge?:  (family  available)     Current dc plan is home, CM will follow for dc needs

## 2021-08-28 NOTE — ASSESSMENT & PLAN NOTE
Secondary to CHF exacerbation and AFib with RVR  Increase midodrine to 5 mg t i d   And observe for now

## 2021-08-28 NOTE — PROGRESS NOTES
[address] Joee Omi  Progress Note Alta Mems 10/7/1924, 80 y o  female MRN: [id number]  Unit/Bed#: -01 Encounter: [id number]  Primary Care Provider: Ivan Montague DO   Date and time admitted to hospital: 8/27/2021 11:24 AM    * Acute respiratory failure with hypoxia [facility])  Assessment & Plan  Patient has acute respiratory failure with hypoxia secondary to acute CHF exacerbation  Continue oxygen via nasal cannula to maintain pulse ox more than 92% and placed on gentle diuresis limited by hypotension  Does not use any oxygen at home and is currently needing around 4-5 L of oxygen via nasal cannula    Acute exacerbation of CHF (congestive heart failure) ([facility])  Assessment & Plan  Wt Readings from Last 3 Encounters:   08/28/21 50 2 kg (110 lb 10 7 oz)   Patient has acute CHF exacerbation  No previous 2D echo noted in the system  Was on Lasix at home which was recently being adjusted outpatient by PCP due to worsening leg swelling  Noticed to have anasarca today with 2 to 3+ pitting edema bilateral lower extremity and bilateral pleural effusions  Ordered 2D echo for Monday once rate is better controlled  Received Lasix 40 mg IV x1 in the ER  Will continue Lasix 20 mg IV b i d  Low-dose due to hypotension and try to gently diurese if possible  Use midodrine to prevent hypotension  Please note patient does not want any invasive or aggressive interventions to be done and hence will not consider thoracentesis at this time or pressors or cardioversion    Goals of care discussed at length with patient and niece at bedside  Patient will be DNR/DNI with no cardioversion or aggressive invasive interventions to be done  Patient family understand that due to her advanced age and critical condition at this time it is quite possible that she might not survive this episode    Will try medications however if she appears to be decompensating and worsening will discuss about comfort care          Atrial fibrillation with RVR [facility])  Assessment & Plan  Patient has new onset AFib with RVR  Blood pressures are low normal   Initially placed on Cardizem drip and digoxin for rate control  Initially heart rate was 160s  Now down to 100-120  Cardizem drip has been discontinued  Briefly placed on oral Cardizem  as EF is unknown will avoid calcium channel blockers at this time and placed on metoprolol 25 mg oral twice daily    Continue telemetry monitoring and level 2 step-down  Patient is DNR/DNI and does not want to be cardioverted  Does not want full strength anticoagulation  Does not want any aggressive or invasive interventions to be done  Placed on midodrine 5 mg t i d  To try to raise her blood pressures so she can consistently get her beta-blocker and Lasix  Placed on Eliquis 2 5 mg twice daily  Monitor renal function closely    BEATRICE (acute kidney injury) [facility])  Assessment & Plan  Patient has acute kidney injury with creatinine of 1 6  Previous labs from a few years ago showed normal creatinine of 1  Continue gentle diuresis  Avoid hypotension and nephrotoxic agents  Idiopathic hypotension  Assessment & Plan  Secondary to CHF exacerbation and AFib with RVR  Increase midodrine to 5 mg t i d  And observe for now      VTE Pharmacologic Prophylaxis:   Pharmacologic: Apixaban (Eliquis)  Mechanical VTE Prophylaxis in Place: Yes    Patient Centered Rounds: I have performed bedside rounds with nursing staff today  Discussions with Specialists or Other Care Team Provider:  None    Education and Discussions with Family / Patient:  Discussed with patient at bedside and will update family    Time Spent for Care: 45 minutes  More than 50% of total time spent on counseling and coordination of care as described above      Current Length of Stay: 1 day(s)    Current Patient Status: Inpatient   Certification Statement: The patient will continue to require additional inpatient hospital stay due to AFib with RVR    Discharge Plan:  Pending progress  Overall prognosis is guarded    Code Status: Level 3 - DNAR and DNI      Subjective:   Patient denies any chest pain  Still feels short of breath but feels more comfortable with oxygen on at rest   Denies any nausea vomiting  Not urinating much    Objective:     Vitals:   Temp (24hrs), Av 9 °F (36 6 °C), Min:97 4 °F (36 3 °C), Max:98 4 °F (36 9 °C)    Temp:  [97 4 °F (36 3 °C)-98 4 °F (36 9 °C)] 97 5 °F (36 4 °C)  HR:  [] 116  Resp:  [18-38] 18  BP: ()/(49-79) 103/59  SpO2:  [88 %-95 %] 93 %  Body mass index is 22 35 kg/m²  Input and Output Summary (last 24 hours): Intake/Output Summary (Last 24 hours) at 2021 1043  Last data filed at 2021 0920  Gross per 24 hour   Intake 245 67 ml   Output 550 ml   Net -304 33 ml       Physical Exam:     Physical Exam  Vitals and nursing note reviewed  Constitutional:       Comments: Frail elderly female with severe generalized muscle wasting noted   HENT:      Head: Normocephalic and atraumatic  Right Ear: External ear normal       Left Ear: External ear normal       Nose: Nose normal       Mouth/Throat:      Pharynx: Oropharynx is clear  Eyes:      Pupils: Pupils are equal, round, and reactive to light  Cardiovascular:      Rate and Rhythm: Tachycardia present  Rhythm irregular  Heart sounds: Normal heart sounds  Pulmonary:      Effort: Pulmonary effort is normal       Comments: Moderate air entry bilaterally with diminished breath sounds mid to lower chest bilaterally  Abdominal:      General: Bowel sounds are normal       Palpations: Abdomen is soft  Tenderness: There is no abdominal tenderness  Musculoskeletal:         General: Normal range of motion  Cervical back: Normal range of motion and neck supple  Right lower leg: Edema present  Left lower leg: Edema present  Skin:     General: Skin is warm and dry        Capillary Refill: Capillary refill takes less than 2 seconds  Neurological:      General: No focal deficit present  Mental Status: She is alert  Comments: Oriented to person and place   Psychiatric:         Mood and Affect: Mood normal             Additional Data:     Labs:    Results from last 7 days   Lab Units 08/28/21  0427 08/27/21  1155   WBC Thousand/uL 7 86 9 49   HEMOGLOBIN g/dL 12 0 12 9   HEMATOCRIT % 36 7 40 1   PLATELETS Thousands/uL 188 211   NEUTROS PCT %  --  87*   LYMPHS PCT %  --  7*   MONOS PCT %  --  6   EOS PCT %  --  0     Results from last 7 days   Lab Units 08/28/21  0427 08/27/21  1155   SODIUM mmol/L 137 139   POTASSIUM mmol/L 4 2 4 3   CHLORIDE mmol/L 105 104   CO2 mmol/L 26 25   BUN mg/dL 35* 35*   CREATININE mg/dL 1 67* 1 62*   ANION GAP mmol/L 6 10   CALCIUM mg/dL 8 8 9 0   ALBUMIN g/dL  --  3 5   TOTAL BILIRUBIN mg/dL  --  0 95   ALK PHOS U/L  --  54   ALT U/L  --  13   AST U/L  --  15   GLUCOSE RANDOM mg/dL 94 125     Results from last 7 days   Lab Units 08/27/21  1237   INR  1 26*             Results from last 7 days   Lab Units 08/27/21  1155   LACTIC ACID mmol/L 1 6           * I Have Reviewed All Lab Data Listed Above  * Additional Pertinent Lab Tests Reviewed:  Anh 66 Admission Reviewed    Imaging:    Imaging Reports Reviewed Today Include:  Chest x-ray  Imaging Personally Reviewed by Myself Includes:  Chest x-ray    Recent Cultures (last 7 days):           Last 24 Hours Medication List:   Current Facility-Administered Medications   Medication Dose Route Frequency Provider Last Rate    acetaminophen  650 mg Oral Q6H PRN Faye Carrion MD      apixaban  2 5 mg Oral BID Faye Carrion MD      calcium carbonate  1,000 mg Oral Daily PRN Faye Carrion MD      ezetimibe  10 mg Oral Daily Faye Carrion MD      furosemide  20 mg Intravenous BID (diuretic) Faye Carrion MD      metoprolol tartrate  25 mg Oral Q12H [address] Faye Carrion MD      midodrine  5 mg Oral TID AC Faye Carrion MD  ondansetron  4 mg Intravenous Q6H PRN Bossman Sandoval MD          Today, Patient Was Seen By: Bossman Sandoval MD    ** Please Note: Dictation voice to text software may have been used in the creation of this document   **

## 2021-08-28 NOTE — ASSESSMENT & PLAN NOTE
Patient has acute respiratory failure with hypoxia secondary to acute CHF exacerbation    Continue oxygen via nasal cannula to maintain pulse ox more than 92% and placed on gentle diuresis limited by hypotension  Does not use any oxygen at home and is currently needing around 4-5 L of oxygen via nasal cannula

## 2021-08-28 NOTE — UTILIZATION REVIEW
Initial Clinical Review    Admission: Date/Time/Statement:   Admission Orders (From admission, onward)     Ordered        08/27/21 1532  Inpatient Admission  Once                   Orders Placed This Encounter   Procedures    Inpatient Admission     Standing Status:   Standing     Number of Occurrences:   1     Order Specific Question:   Level of Care     Answer:   Level 2 Stepdown / HOT [14]     Order Specific Question:   Estimated length of stay     Answer:   More than 2 Midnights     Order Specific Question:   Certification     Answer:   I certify that inpatient services are medically necessary for this patient for a duration of greater than two midnights  See H&P and MD Progress Notes for additional information about the patient's course of treatment  ED Arrival Information     Expected Arrival Acuity    - 8/27/2021 11:10 Emergent         Means of arrival Escorted by Service Admission type    [facility] Emergency         Arrival complaint    Fluid Retention        Chief Complaint   Patient presents with    Shortness of Breath     pt c/o leg swelling for 3-4 days with b/l leg pain and SOB starting today     Initial Presentation:   80y Female to ED presents with difficulty breathing and worsening leg swelling 2-3 days  PMH of Afib and Idiopathic hypotension  Admit Inpatient level of care for Acute respiratory failure with hypoxia, Acute exacerbation of CHF, New onset Afib with RVR and BEATRICE  Was on home Lasix and recently adjusted by [facility] due to worsening leg swelling  Noticed to have anasarca today with 2 to 3+ pitting edema bilateral lower extremity and bilateral pleural effusions  Echo  Given Iv Lasix 40 mg in ED and continue IV Lasix bid  Low-dose due to hypotension and try gently diurese if possible  She does not want any invasive or aggressive interventions to be done and hence will not consider thoracentesis at this time   Currently on Iv Cardizem drip however heart rates jame still uncontrolled  Titrate to keep SBP more than 90  Continue tele monitoring  Pt does not want to be cardioverted  Does not want full strength anticoagulation  Creat 1 6 on admit  8/27 Cardiology cons; Afib with RVR- unknown duration, started on cardizem drip  Acute on chronic HF unknown EF- elevated bnp, pleural effusions, sob, edema  Currently on cardizem Drip for rate control titrating as needed pending BP response  Pt continues to decline anticoagulation  Continue diuresis with Iv Lasix 40 bid  Echo to be updated once HR better controlled  Start digoxin 250 mcg now and again in 6h  On exam; Rales  8/27 Progress notes; Still in AFib but heart rate in the 80s with blood pressure 80 by 50  Cardizem drip d/c and started on po Cardizem with holding parameters  Continue digoxin  Date: 8/28   Day 2:   Progress notes; Currently needing around 4-5 L O2 NC via NC  Noticed to have anasarca today with 2 to 3+ pitting edema bilateral lower extremity and bilateral pleural effusions  Continue diuresing Iv Lasix bid  Use midodrine to prevent hypotension  Will try medications however if she appears to be decompensating and worsening will discuss about comfort care  Initially heart rate was 160s  Now down to 100-120  Continue tele monitoring  Placed on midodrine 5 mg t i d  To try to raise her blood pressures so she can consistently get her beta-blocker and Lasix  Start Eliquis bid  Monitor renal function closely  Pt still short of breath but feels more comfortable with O2 at rest  On exam; moderate air entry bilaterally with diminished breath sounds mid to lower chest bilaterally  Edema to BLE       ED Triage Vitals   Temperature Pulse Respirations Blood Pressure SpO2   08/27/21 1130 08/27/21 1134 08/27/21 1134 08/27/21 1134 08/27/21 1134   98 3 °F (36 8 °C) (!) 145 19 108/70 (!) 88 %      Temp Source Heart Rate Source Patient Position - Orthostatic VS BP Location FiO2 (%)   08/27/21 1130 08/27/21 1134 08/27/21 [phone] 08/27/21 1134 --   Oral Monitor Lying Left arm       Pain Score       08/27/21 1134       3          Wt Readings from Last 1 Encounters:   08/28/21 50 2 kg (110 lb 10 7 oz)     Additional Vital Signs:   08/28/21 0845  --  116  Abnormal   --  103/59  --  --  --  --  --  --   08/28/21 0700  --  114  Abnormal   18  98/52  70  93 %  --  --  --  --   08/28/21 0600  97 5 °F (36 4 °C)  109  Abnormal   31  Abnormal   94/58  70  92 %  --  --  --  Lying   08/28/21 0500  --  80  18  85/49  Abnormal   61  92 %  --  --  --  --   08/28/21 0400  97 4 °F (36 3 °C)  Abnormal   91  18  90/55  66  92 %  36  4 L/min  Nasal cannula  Lying   08/28/21 0300  --  100  22  105/55  73  91 %  36  4 L/min  Nasal cannula       /27/21 1930  --  82  29  Abnormal   87/51  Abnormal   63  93 %  --  --  --  --   08/27/21 1916  97 4 °F (36 3 °C)  Abnormal   83  35  Abnormal   82/53  Abnormal   63  92 %  --  --  --  --   08/27/21 1900  --  82  25  Abnormal   84/54  Abnormal   64  92 %  --  --  --  --   08/27/21 1845  --  80  23  Abnormal   85/54  Abnormal   65  93 %  --  --  --  --   08/27/21 1830  --  84  27  Abnormal   86/50  Abnormal   63  91 %  --  --  --       Pertinent Labs/Diagnostic Test Results:   8/27   PCXR - Cardiomegaly with bilateral pleural effusions and probable bibasilar atelectasis most likely on the basis of congestive heart failure  VAS lower limb venous duplex - No evidence of acute or chronic deep vein thrombosis    No evidence of superficial thrombophlebitis noted          Results from last 7 days   Lab Units 08/28/21 0427 08/27/21  1155   WBC Thousand/uL 7 86 9 49   HEMOGLOBIN g/dL 12 0 12 9   HEMATOCRIT % 36 7 40 1   PLATELETS Thousands/uL 188 211   NEUTROS ABS Thousands/µL  --  8 19*         Results from last 7 days   Lab Units 08/28/21  0427 08/27/21  1155   SODIUM mmol/L 137 139   POTASSIUM mmol/L 4 2 4 3   CHLORIDE mmol/L 105 104   CO2 mmol/L 26 25   ANION GAP mmol/L 6 10   BUN mg/dL 35* 35*   CREATININE mg/dL 1 67* 1 62*   EGFR ml/min/1 73sq m 26 27   CALCIUM mg/dL 8 8 9 0   MAGNESIUM mg/dL 2 2 2 4     Results from last 7 days   Lab Units 08/27/21  1155   AST U/L 15   ALT U/L 13   ALK PHOS U/L 54   TOTAL PROTEIN g/dL 6 5   ALBUMIN g/dL 3 5   TOTAL BILIRUBIN mg/dL 0 95         Results from last 7 days   Lab Units 08/28/21  0427 08/27/21  1155   GLUCOSE RANDOM mg/dL 94 125       Results from last 7 days   Lab Units 08/27/21  1155   PH DONALD  7 393   PCO2 DONALD mm Hg 33 7*   PO2 DONALD mm Hg 53 7*   HCO3 DONALD mmol/L 20 1*   BASE EXC DONALD mmol/L -4 0   O2 CONTENT DONALD ml/dL 16 3   O2 HGB, VENOUS % 84 2*             Results from last 7 days   Lab Units 08/27/21  1155   TROPONIN I ng/mL <0 02         Results from last 7 days   Lab Units 08/27/21  1237   PROTIME seconds 15 5*   INR  1 26*   PTT seconds 33     Results from last 7 days   Lab Units 08/28/21  0427   TSH 3RD GENERATON uIU/mL 0 719         Results from last 7 days   Lab Units 08/27/21  1155   LACTIC ACID mmol/L 1 6         Results from last 7 days   Lab Units 08/28/21  0427   DIGOXIN LVL ng/mL 2 0     Results from last 7 days   Lab Units 08/27/21  1155   NT-PRO BNP pg/mL 32,826*       ED Treatment:   Medication Administration from 08/27/2021 1109 to 08/27/2021 1644       Date/Time Order Dose Route Action     08/27/2021 1155 furosemide (LASIX) injection 40 mg 40 mg Intravenous Given     08/27/2021 1521 diltiazem (CARDIZEM) 125 mg in sodium chloride 0 9 % 125 mL infusion 15 mg/hr Intravenous Rate/Dose Change     08/27/2021 1450 diltiazem (CARDIZEM) 125 mg in sodium chloride 0 9 % 125 mL infusion 12 5 mg/hr Intravenous Rate/Dose Change     08/27/2021 1430 diltiazem (CARDIZEM) 125 mg in sodium chloride 0 9 % 125 mL infusion 10 mg/hr Intravenous Rate/Dose Change     08/27/2021 1359 diltiazem (CARDIZEM) 125 mg in sodium chloride 0 9 % 125 mL infusion 7 5 mg/hr Intravenous Rate/Dose Change     08/27/2021 1344 diltiazem (CARDIZEM) 125 mg in sodium chloride 0 9 % 125 mL infusion 5 mg/hr Intravenous New Bag     08/27/2021 1533 digoxin (LANOXIN) tablet 250 mcg 250 mcg Oral Given        Past Medical History:   Diagnosis Date    CHF (congestive heart failure) (HCC)     Hyperlipidemia     Hypertension     Osteoarthritis     Urinary incontinence      Present on Admission:  **None**      Admitting Diagnosis: CHF (congestive heart failure) (HCC) [I50 9]  SOB (shortness of breath) [R06 02]  Hypoxia [R09 02]  New onset a-fib (HCC) [I48 91]  Bilateral leg edema [R60 0]  Age/Sex: 80 y o  female     Admission Orders:  Scheduled Medications:  apixaban, 2 5 mg, Oral, BID  ezetimibe, 10 mg, Oral, Daily  furosemide, 20 mg, Intravenous, TID (diuretic)  metoprolol tartrate, 25 mg, Oral, Q12H NETTIE  midodrine, 5 mg, Oral, TID AC      Continuous IV Infusions:    diltiazem (CARDIZEM) 125 mg in sodium chloride 0 9 % 125 mL infusion   Rate: 1-15 mL/hr Dose: 1-15 mg/hr  Freq: Titrated Route: IV  Last Dose: Stopped (08/27/21 1923)  Start: 08/27/21 1330 End: 08/27/21 1930    PRN Meds:  acetaminophen, 650 mg, Oral, Q6H PRN  calcium carbonate, 1,000 mg, Oral, Daily PRN  ondansetron, 4 mg, Intravenous, Q6H PRN      Continuous cardiac monitoring  IP CONSULT TO CARDIOLOGY    Network Utilization Review Department  ATTENTION: Please call with any questions or concerns to [phone] and carefully listen to the prompts so that you are directed to the right person  All voicemails are confidential   [facility] all requests for admission clinical reviews, approved or denied determinations and any other requests to dedicated fax number below belonging to the campus where the patient is receiving treatment   List of dedicated fax numbers for the Facilities:  [address] DENIALS (Administrative/Medical Necessity) [phone]   [address] (Maternity/NICU/Pediatrics) [phone] [address] [phone] [address] [phone] 5000 [facility] Isabelle Anaya [facility] [phone]   [facility] Mathew Bentley [facility] [id number] [address] Shanta Beal [facility] [address] [place] [address] [phone]

## 2021-08-28 NOTE — ASSESSMENT & PLAN NOTE
Patient has new onset AFib with RVR  Blood pressures are low normal   Initially placed on Cardizem drip and digoxin for rate control  Initially heart rate was 160s  Now down to 100-120  Cardizem drip has been discontinued  Briefly placed on oral Cardizem  as EF is unknown will avoid calcium channel blockers at this time and placed on metoprolol 25 mg oral twice daily    Continue telemetry monitoring and level 2 step-down  Patient is DNR/DNI and does not want to be cardioverted  Does not want full strength anticoagulation  Does not want any aggressive or invasive interventions to be done  Placed on midodrine 5 mg t i d  To try to raise her blood pressures so she can consistently get her beta-blocker and Lasix  Placed on Eliquis 2 5 mg twice daily    Monitor renal function closely

## 2021-08-29 PROBLEM — I63.9 ACUTE STROKE DUE TO ISCHEMIA (HCC): Status: ACTIVE | Noted: 2021-08-29

## 2021-08-29 PROBLEM — Z71.89 GOALS OF CARE, COUNSELING/DISCUSSION: Status: ACTIVE | Noted: 2021-08-29

## 2021-08-29 LAB — GLUCOSE SERPL-MCNC: 99 MG/DL (ref 65–140)

## 2021-08-29 PROCEDURE — 99233 SBSQ HOSP IP/OBS HIGH 50: CPT | Performed by: FAMILY MEDICINE

## 2021-08-29 PROCEDURE — NC001 PR NO CHARGE: Performed by: PHYSICIAN ASSISTANT

## 2021-08-29 PROCEDURE — 82948 REAGENT STRIP/BLOOD GLUCOSE: CPT

## 2021-08-29 RX ORDER — LORAZEPAM 2 MG/ML
1 INJECTION INTRAMUSCULAR
Status: DISCONTINUED | OUTPATIENT
Start: 2021-08-29 | End: 2021-08-30 | Stop reason: HOSPADM

## 2021-08-29 RX ADMIN — MORPHINE SULFATE 2 MG: 2 INJECTION, SOLUTION INTRAMUSCULAR; INTRAVENOUS at 04:11

## 2021-08-29 RX ADMIN — MORPHINE SULFATE 2 MG: 2 INJECTION, SOLUTION INTRAMUSCULAR; INTRAVENOUS at 19:36

## 2021-08-29 RX ADMIN — MORPHINE SULFATE 2 MG: 2 INJECTION, SOLUTION INTRAMUSCULAR; INTRAVENOUS at 06:30

## 2021-08-29 NOTE — RAPID RESPONSE
Rapid Response Note  Porsha Yanez 80 y o  female MRN: [id number]  Unit/Bed#: -01 Encounter: [id number]    Rapid Response Notification(s):   Response called date/time:  8/29/2021 3:54 AM  Response team arrival date/time:  8/29/2021 3:54 AM  Response end date/time:  8/29/2021 4:03 AM  Rapid response location:  ICU  Primary reason for rapid response call:  Acute change in neuro status    Rapid Response Intervention(s):   Airway:  None  Breathing:  Oxygen  Circulation:  None  Fluids administered:  None  Medications administered:  None       Background/Situation:   Porsha Yanez is a 80 y o  female with a past medical history CHF and atrial fibrillation who was admitted on August 27th with acute respiratory failure due to acute exacerbation of CHF and new onset AFib with RVR  Previously the patient had been awake alert and oriented however when the nurse came in to check on her at approximately 3:53 a m , the patient was unresponsive to stimulus stimuli with altered mental status  The nurses seen her approximately 30 minutes prior and patient was conversant alert and following commands  A rapid response was initiated  Review of Systems   Unable to perform ROS: Mental status change       Objective:   Vitals:    08/29/21 0100 08/29/21 0200 08/29/21 0300 08/29/21 0349   BP: 105/57 104/61 104/59    BP Location:       Pulse: 72 74 79 (!) 129   Resp: 17 20 22 22   Temp:       TempSrc:       SpO2: 95% 94% 92% (!) 83%   Weight:       Height:         Physical Exam  Vitals and nursing note reviewed  Constitutional:       Interventions: Nasal cannula in place  Comments: Eyes open spontanously, but not tracking   HENT:      Head: Normocephalic  Eyes:      Conjunctiva/sclera: Conjunctivae normal       Pupils: Pupils are equal, round, and reactive to light  Cardiovascular:      Rate and Rhythm: Tachycardia present  Rhythm irregularly irregular     Pulmonary:      Effort: Tachypnea and respiratory distress present  Breath sounds: Normal breath sounds  Abdominal:      General: Bowel sounds are normal  There is no distension  Palpations: Abdomen is soft  Tenderness: There is no guarding  Musculoskeletal:      Right lower leg: Edema present  Left lower leg: Edema present  Skin:     General: Skin is warm  Neurological:      Mental Status: She is alert  GCS: GCS eye subscore is 4  GCS verbal subscore is 1  GCS motor subscore is 4  Cranial Nerves: Facial asymmetry present  Comments: Eyes open, but not tracking  No verbal response  Not following commands  Moves all extremities  Left facial droop  Assessment:   · AMS likely CVA    Plan:   · Blood glucose 125, O2 sats 83% and oxygen increased to 6L with sats of 92%  Initially labs were drawn and ABG was ordered  Plans were to initiate stroke alert, however Fariha Perry from [facility] team notified critical care team that the family was likely going to initiate hospice measures today  She immediately called the niece, Rekha Finney, and explained the acute change and likely diagnosis  Gautam Klein states her aunt would not want any aggressive measures done therefore she wanted to pursue comfort measures at this time  Labs sent stroke alert protocol were canceled  Supportive care with IV morphine and Ativan for increased work of breathing will be provided  The niece will be coming into the hospital immediately to the by her aunt's side  Please see note from Saud Perry for advanced care planning  Rapid Response Outcome  Family notified of transfer: yes  Family member contacted: Rekha Sharmin     Portions of the record may have been created with voice recognition software  Occasional wrong word or "sound a like" substitutions may have occurred due to the inherent limitations of voice recognition software  Read the chart carefully and recognize, using context, where substitutions have occurred      Power Harris RUTH

## 2021-08-29 NOTE — ASSESSMENT & PLAN NOTE
Patient has acute kidney injury with creatinine of 1 6  Previous labs from a few years ago showed normal creatinine of 1    Now on comfort care [facility] [id number]

## 2021-08-29 NOTE — ASSESSMENT & PLAN NOTE
Discussed with niece and got daughter  Patient is now comfort care/hospice due to acute stroke    Will need placement either in hospice house or in skilled nursing facility as she is unable to return home as she lives alone and has no children

## 2021-08-29 NOTE — ASSESSMENT & PLAN NOTE
Patient has an acute stroke with weakness noted of the right upper extremity and right lower extremity and also aphasia noted    Family decided on comfort care/hospice care

## 2021-08-29 NOTE — PROGRESS NOTES
[address]  Progress Note Nita Shepherd 10/7/1924, 80 y o  female MRN: [id number]  Unit/Bed#: -01 Encounter: [id number]  Primary Care Provider: Shahnaz Arteaga DO   Date and time admitted to hospital: 8/27/2021 11:24 AM    * Acute respiratory failure with hypoxia [facility])  Assessment & Plan  Patient has acute respiratory failure with hypoxia secondary to acute CHF exacerbation  Continue oxygen via nasal cannula to maintain pulse ox more than 92% and placed on gentle diuresis limited by hypotension  Does not use any oxygen at home and is currently needing around 4-5 L of oxygen via nasal cannula  Now placed on comfort care/hospice care    Acute stroke due to ischemia [facility])  Assessment & Plan  Patient has an acute stroke with weakness noted of the right upper extremity and right lower extremity and also aphasia noted  Family decided on comfort care/hospice care    Acute exacerbation of CHF (congestive heart failure) (HCC)  Assessment & Plan  Wt Readings from Last 3 Encounters:   08/28/21 50 2 kg (110 lb 10 7 oz)   Patient has acute CHF exacerbation  No previous 2D echo noted in the system  Was on Lasix at home which was recently being adjusted outpatient by PCP due to worsening leg swelling  Noticed to have anasarca with 2 to 3+ pitting edema bilateral lower extremity and bilateral pleural effusions  Received Lasix 40 mg IV x1 in the ER  Will continue Lasix 20 mg IV b i d  Low-dose due to hypotension and try to gently diurese if possible  Use midodrine to prevent hypotension  Please note patient does not want any invasive or aggressive interventions to be done and hence will not consider thoracentesis at this time or pressors or cardioversion    8/29:  Patient found to have acute change in mental status this morning and found to have an acute stroke involving weakness of her right upper extremity and right lower extremity with neglect    Discussed with family at length and place her on comfort care/hospice    Goals of care discussed at length with patient and niece at bedside  Patient will be DNR/DNI with no cardioversion or aggressive invasive interventions to be done  Patient family understand that due to her advanced age and critical condition at this time it is quite possible that she might not survive this episode  Will try medications however if she appears to be decompensating and worsening will discuss about comfort care          Atrial fibrillation with RVR [facility])  Assessment & Plan  Patient has new onset AFib with RVR  Blood pressures are low normal   Initially placed on Cardizem drip and digoxin for rate control  Initially heart rate was 160s  Now down to 100-120  Cardizem drip has been discontinued  Briefly placed on oral Cardizem  as EF is unknown will avoid calcium channel blockers at this time and placed on metoprolol 25 mg oral twice daily    Continue telemetry monitoring and level 2 step-down  Patient is DNR/DNI and does not want to be cardioverted  Does not want full strength anticoagulation  Does not want any aggressive or invasive interventions to be done  Placed on midodrine 5 mg t i d  To try to raise her blood pressures so she can consistently get her beta-blocker and Lasix  Placed on Eliquis 2 5 mg twice daily  Monitor renal function closely    Discontinue Eliquis, midodrine and beta-blockers as patient is now comfort care/hospice    Goals of care, counseling/discussion  Assessment & Plan  Discussed with niece and got daughter  Patient is now comfort care/hospice due to acute stroke  Will need placement either in hospice house or in skilled nursing facility as she is unable to return home as she lives alone and has no children    BEATRICE (acute kidney injury) [facility])  Assessment & Plan  Patient has acute kidney injury with creatinine of 1 6  Previous labs from a few years ago showed normal creatinine of 1    Now on comfort care    Idiopathic hypotension  Assessment & Plan  Secondary to CHF exacerbation and AFib with RVR  VTE Pharmacologic Prophylaxis:   Pharmacologic: Pharmacologic VTE Prophylaxis contraindicated due to Comfort care  Mechanical VTE Prophylaxis in Place: No    Patient Centered Rounds: I have performed bedside rounds with nursing staff today  Discussions with Specialists or Other Care Team Provider:  Will discuss with case management    Education and Discussions with Family / Patient:  Discussed with niece and god daughter    Time Spent for Care: 30 minutes  More than 50% of total time spent on counseling and coordination of care as described above  Current Length of Stay: 2 day(s)    Current Patient Status: Inpatient   Certification Statement: The patient will continue to require additional inpatient hospital stay due to Acute stroke    Discharge Plan:  Consult placed to hospice  Await disposition    Code Status: Level 4 - Comfort Care      Subjective:   Patient is aphasic  Neglecting the right side  Not responding to any questions  Somnolent and barely arousable    Objective:     Vitals:   Temp (24hrs), Av 7 °F (36 5 °C), Min:97 3 °F (36 3 °C), Max:98 °F (36 7 °C)    Temp:  [97 3 °F (36 3 °C)-98 °F (36 7 °C)] 98 °F (36 7 °C)  HR:  [] 85  Resp:  [17-31] 20  BP: ()/(51-65) 144/63  SpO2:  [83 %-96 %] 92 %  Body mass index is 22 35 kg/m²  Input and Output Summary (last 24 hours): Intake/Output Summary (Last 24 hours) at 2021 0942  Last data filed at 2021 0200  Gross per 24 hour   Intake --   Output  ml   Net -2010 ml       Physical Exam:     Physical Exam  Vitals and nursing note reviewed  Constitutional:       Appearance: She is ill-appearing  Comments: Frail elderly female with severe generalized muscle wasting noted   HENT:      Head: Normocephalic and atraumatic        Right Ear: External ear normal       Left Ear: External ear normal       Nose: Nose normal  Mouth/Throat:      Pharynx: Oropharynx is clear  Eyes:      Pupils: Pupils are equal, round, and reactive to light  Cardiovascular:      Rate and Rhythm: Normal rate  Rhythm irregular  Heart sounds: Normal heart sounds  Pulmonary:      Effort: Pulmonary effort is normal       Comments: Moderate air entry bilaterally decreased breath sounds bilateral bases  Abdominal:      General: Bowel sounds are normal       Palpations: Abdomen is soft  Tenderness: There is no abdominal tenderness  Musculoskeletal:         General: Normal range of motion  Cervical back: Normal range of motion and neck supple  Skin:     General: Skin is warm and dry  Capillary Refill: Capillary refill takes less than 2 seconds  Neurological:      Comments: Somnolent and barely arousable  Right facial droop noted and flaccid right arm and power of 1-2 x 5 on the right leg  Additional Data:     Labs:    Results from last 7 days   Lab Units 08/28/21  0427 08/27/21  1155   WBC Thousand/uL 7 86 9 49   HEMOGLOBIN g/dL 12 0 12 9   HEMATOCRIT % 36 7 40 1   PLATELETS Thousands/uL 188 211   NEUTROS PCT %  --  87*   LYMPHS PCT %  --  7*   MONOS PCT %  --  6   EOS PCT %  --  0     Results from last 7 days   Lab Units 08/28/21  0427 08/27/21  1155   SODIUM mmol/L 137 139   POTASSIUM mmol/L 4 2 4 3   CHLORIDE mmol/L 105 104   CO2 mmol/L 26 25   BUN mg/dL 35* 35*   CREATININE mg/dL 1 67* 1 62*   ANION GAP mmol/L 6 10   CALCIUM mg/dL 8 8 9 0   ALBUMIN g/dL  --  3 5   TOTAL BILIRUBIN mg/dL  --  0 95   ALK PHOS U/L  --  54   ALT U/L  --  13   AST U/L  --  15   GLUCOSE RANDOM mg/dL 94 125     Results from last 7 days   Lab Units 08/27/21  1237   INR  1 26*     Results from last 7 days   Lab Units 08/29/21  0355   POC GLUCOSE mg/dl 99         Results from last 7 days   Lab Units 08/27/21  1155   LACTIC ACID mmol/L 1 6           * I Have Reviewed All Lab Data Listed Above  * Additional Pertinent Lab Tests Reviewed:  All Labs For Current Hospital Admission Reviewed    Imaging:    Imaging Reports Reviewed Today Include:  None  Imaging Personally Reviewed by Myself Includes:  None    Recent Cultures (last 7 days):           Last 24 Hours Medication List:   Current Facility-Administered Medications   Medication Dose Route Frequency Provider Last Rate    acetaminophen  650 mg Oral Q6H PRN Miguel Chicas MD      calcium carbonate  1,000 mg Oral Daily PRN Miguel Chicas MD      LORazepam  1 mg Intravenous Q15 Min PRN Salud Gomez PA-C      morphine injection  2 mg Intravenous Q15 Min PRN Salud Gomez PA-C      ondansetron  4 mg Intravenous Q6H PRN Miguel Chicas MD          Today, Patient Was Seen By: Miguel Chicas MD    ** Please Note: Dictation voice to text software may have been used in the creation of this document   **

## 2021-08-29 NOTE — ASSESSMENT & PLAN NOTE
Patient has new onset AFib with RVR  Blood pressures are low normal   Initially placed on Cardizem drip and digoxin for rate control  Initially heart rate was 160s  Now down to 100-120  Cardizem drip has been discontinued  Briefly placed on oral Cardizem  as EF is unknown will avoid calcium channel blockers at this time and placed on metoprolol 25 mg oral twice daily    Continue telemetry monitoring and level 2 step-down  Patient is DNR/DNI and does not want to be cardioverted  Does not want full strength anticoagulation  Does not want any aggressive or invasive interventions to be done  Placed on midodrine 5 mg t i d  To try to raise her blood pressures so she can consistently get her beta-blocker and Lasix  Placed on Eliquis 2 5 mg twice daily    Monitor renal function closely    Discontinue Eliquis, midodrine and beta-blockers as patient is now comfort care/hospice

## 2021-08-29 NOTE — ASSESSMENT & PLAN NOTE
Wt Readings from Last 3 Encounters:   08/28/21 50 2 kg (110 lb 10 7 oz)   Patient has acute CHF exacerbation  No previous 2D echo noted in the system  Was on Lasix at home which was recently being adjusted outpatient by PCP due to worsening leg swelling  Noticed to have anasarca with 2 to 3+ pitting edema bilateral lower extremity and bilateral pleural effusions  Received Lasix 40 mg IV x1 in the ER  Will continue Lasix 20 mg IV b i d  Low-dose due to hypotension and try to gently diurese if possible  Use midodrine to prevent hypotension  Please note patient does not want any invasive or aggressive interventions to be done and hence will not consider thoracentesis at this time or pressors or cardioversion    8/29:  Patient found to have acute change in mental status this morning and found to have an acute stroke involving weakness of her right upper extremity and right lower extremity with neglect  Discussed with family at length and place her on comfort care/hospice    Goals of care discussed at length with patient and niece at bedside  Patient will be DNR/DNI with no cardioversion or aggressive invasive interventions to be done  Patient family understand that due to her advanced age and critical condition at this time it is quite possible that she might not survive this episode    Will try medications however if she appears to be decompensating and worsening will discuss about comfort care

## 2021-08-29 NOTE — PROGRESS NOTES
08/29/21 0349   Vitals   Pulse (!) 129   Respirations 22   Oxygen Therapy   SpO2 (!) 83 %     0349 Pt desaturated to 83% and rapid Afib noted on monitor  This nurse reported to room to replace nasal cannula and noted pt to be awake but unresponsive to verbal stimuli  2 RN assistance reported to the room and a rapid response was called  Vitals were taken: , /68/ (90), RR 20, SpO2 96% on 6 LNC  Pupils 3+ equal and reactive, reactive to painful stimuli only, and L sided facial droop observed  Family was immediately contacted by Emperatriz Valle and pt was placed on comfort measures

## 2021-08-29 NOTE — ACP (ADVANCE CARE PLANNING)
D/W abraham Reno via telephone regarding acute onset of mental status change, unresponsive to stimulation with left sided facial droop  Grecia Soriano elected for comfort measures in lieu of aggressive testing and treatment   Pt will be placed on Comfort care and Grecia Soriano will be coming to the hospital

## 2021-08-29 NOTE — ASSESSMENT & PLAN NOTE
Patient has acute respiratory failure with hypoxia secondary to acute CHF exacerbation    Continue oxygen via nasal cannula to maintain pulse ox more than 92% and placed on gentle diuresis limited by hypotension  Does not use any oxygen at home and is currently needing around 4-5 L of oxygen via nasal cannula  Now placed on comfort care/hospice care

## 2021-08-30 VITALS
SYSTOLIC BLOOD PRESSURE: 154 MMHG | DIASTOLIC BLOOD PRESSURE: 69 MMHG | HEART RATE: 85 BPM | RESPIRATION RATE: 18 BRPM | HEIGHT: 59 IN | OXYGEN SATURATION: 92 % | BODY MASS INDEX: 22.31 KG/M2 | TEMPERATURE: 98.2 F | WEIGHT: 110.67 LBS

## 2021-08-30 PROBLEM — Z51.5 HOSPICE CARE: Status: ACTIVE | Noted: 2021-08-29

## 2021-08-30 LAB
ATRIAL RATE: 133 BPM
ATRIAL RATE: 156 BPM
QRS AXIS: 239 DEGREES
QRS AXIS: 260 DEGREES
QRSD INTERVAL: 116 MS
QRSD INTERVAL: 118 MS
QT INTERVAL: 286 MS
QT INTERVAL: 314 MS
QTC INTERVAL: 429 MS
QTC INTERVAL: 499 MS
SARS-COV-2 RNA RESP QL NAA+PROBE: NEGATIVE
T WAVE AXIS: 115 DEGREES
T WAVE AXIS: 122 DEGREES
VENTRICULAR RATE: 135 BPM
VENTRICULAR RATE: 152 BPM

## 2021-08-30 PROCEDURE — U0003 INFECTIOUS AGENT DETECTION BY NUCLEIC ACID (DNA OR RNA); SEVERE ACUTE RESPIRATORY SYNDROME CORONAVIRUS 2 (SARS-COV-2) (CORONAVIRUS DISEASE [COVID-19]), AMPLIFIED PROBE TECHNIQUE, MAKING USE OF HIGH THROUGHPUT TECHNOLOGIES AS DESCRIBED BY CMS-2020-01-R: HCPCS | Performed by: FAMILY MEDICINE

## 2021-08-30 PROCEDURE — U0005 INFEC AGEN DETEC AMPLI PROBE: HCPCS | Performed by: FAMILY MEDICINE

## 2021-08-30 RX ORDER — ONDANSETRON 4 MG/1
4 TABLET, ORALLY DISINTEGRATING ORAL EVERY 6 HOURS PRN
Qty: 20 TABLET | Refills: 0 | Status: SHIPPED | OUTPATIENT
Start: 2021-08-30

## 2021-08-30 RX ORDER — ACETAMINOPHEN 325 MG/1
650 TABLET ORAL EVERY 6 HOURS PRN
Refills: 0
Start: 2021-08-30

## 2021-08-30 RX ORDER — MORPHINE SULFATE 100 MG/5ML
5 SOLUTION ORAL EVERY 4 HOURS PRN
Qty: 30 ML | Refills: 0 | Status: SHIPPED | OUTPATIENT
Start: 2021-08-30 | End: 2021-09-09

## 2021-08-30 RX ORDER — LORAZEPAM 2 MG/ML
1 CONCENTRATE ORAL EVERY 4 HOURS PRN
Qty: 30 ML | Refills: 0 | Status: SHIPPED | OUTPATIENT
Start: 2021-08-30 | End: 2021-08-30 | Stop reason: SDUPTHER

## 2021-08-30 RX ORDER — MORPHINE SULFATE 100 MG/5ML
5 SOLUTION ORAL EVERY 4 HOURS PRN
Qty: 30 ML | Refills: 0 | Status: SHIPPED | OUTPATIENT
Start: 2021-08-30 | End: 2021-08-30 | Stop reason: SDUPTHER

## 2021-08-30 RX ORDER — LORAZEPAM 2 MG/ML
1 CONCENTRATE ORAL EVERY 4 HOURS PRN
Qty: 30 ML | Refills: 0 | Status: SHIPPED | OUTPATIENT
Start: 2021-08-30 | End: 2021-09-09

## 2021-08-30 RX ADMIN — MORPHINE SULFATE 2 MG: 2 INJECTION, SOLUTION INTRAMUSCULAR; INTRAVENOUS at 05:21

## 2021-08-30 RX ADMIN — MORPHINE SULFATE 2 MG: 2 INJECTION, SOLUTION INTRAMUSCULAR; INTRAVENOUS at 01:29

## 2021-08-30 NOTE — NURSING NOTE
Pt discharged to [facility] via [facility]  Report called to unit  Family updated

## 2021-08-30 NOTE — CASE MANAGEMENT
Case Management Progress Note    Patient name Galdino Daniels  Location /-01 MRN [id number]  : 10/7/1924 Date 2021       LOS (days): 3  Geometric Mean LOS (GMLOS) (days):   Days to GMLOS:        BUNDLE:      OBJECTIVE:  Pt is a 80y o  year old /Civil Union, white or  [1], female with [employer] preference of [address] admitted on  11:24 AM  Pt is admitted to -01 at [address] [place] [address] with complaints of Acute respiratory failure with hypoxia ([facility] [address] )   Current admission status: Inpatient  Preferred Pharmacy:   [facility], [address]  Phone: [phone] Fax: [phone]    Primary Care Provider: Shahanz Arteaga DO    Primary Insurance: Ivone Shannon [address] [facility] REP  Secondary Insurance:     PROGRESS NOTE:    [facility] will accept today, [facility] has accepted  Called for transport  Spoke to [facility], requested 1 PM, await call back  Medical Necessity for transportation was completed  Copy available for transport team and a copy was placed in bin to be scanned into the chart  Nursing is aware of tentative transport time  1220-- Confirmed transport time of 2:30 with [address] and [place] [id number] is aware , along with bedside nurse  POLST form done and on the chart

## 2021-08-30 NOTE — ASSESSMENT & PLAN NOTE
Discussed with niece and got daughter  Patient is now comfort care/hospice due to acute stroke  Will need placement either in skilled nursing facility as she is unable to return home as she lives alone and has no children  Continue hospice care with morphine/Ativan for comfort  Currently  appears to be quite comfortable  She is not responding to any questions and unable to move her right arm or right leg    Unable to speak

## 2021-08-30 NOTE — TRANSPORTATION MEDICAL NECESSITY
Section I - General Information    Name of Patient: Maulik Garcia                 : 10/7/1924    Medicare #: [id number]  Transport Date: 21 (PCS is valid for round trips on this date and for all repetitive trips in the 60-day range as noted below )  Origin: [address] [place] [address] West: [facility]    Is the pt's stay covered under Medicare Part A (PPS/DRG)   [x]     Closest appropriate facility? If no, why is transport to more distant facility required? Yes  If hospice pt, is this transport related to pt's terminal illness? No       Section II - Medical Necessity Questionnaire  Ambulance transportation is medically necessary only if other means of transport are contraindicated or would be potentially harmful to the patient  To meet this requirement, the patient must either be "bed confined" or suffer from a condition such that transport by means other than ambulance is contraindicated by the patient's condition  The following questions must be answered by the medical professional signing below for this form to be valid:    1)  Describe the MEDICAL CONDITION (physical and/or mental) of this patient AT [address] [place] [address] that requires the patient to be transported in an ambulance and why transport by other means is contraindicated by the patient's condition: Resp failure with a stroke      2) Is the patient "bed confined" as defined below? No  To be "be confined" the patient must satisfy all three of the following conditions: (1) unable to get up from bed without Assistance; AND (2) unable to ambulate; AND (3) unable to sit in a chair or wheelchair  3) Can this patient safely be transported by car or wheelchair van (i e , seated during transport without a medical attendant or monitoring)?    No    4) In addition to completing questions 1-3 above, please check any of the following conditions that apply*: *Note: supporting documentation for any boxes checked must be maintained in the patient's medical records  If hosp-hosp transfer, describe services needed at 2nd facility not available at 1st facility? N/A    Medical attendant required   Requires oxygen-unable to self administer  Altered mental status- unable to sit upright, stroke, unable to follow commands    Section III - Signature of Physician or Healthcare Professional  I certify that the above information is true and correct based on my evaluation of this patient, and represent that the patient requires transport by ambulance and that other forms of transport are contraindicated  I understand that this information will be used by the Centers for Medicare and Medicaid Services (CMS) to support the determination of medical necessity for ambulance services, and I represent that I have personal knowledge of the patient's condition at time of transport  []  If this box is checked, I also certify that the patient is physically or mentally incapable of signing the ambulance service's claim and that the institution with which I am affiliated has furnished care, services, or assistance to the patient  My signature below is made on behalf of the patient pursuant to 42 CFR §424 36(b)(4)  In accordance with 42 CFR §424 37, the specific reason(s) that the patient is physically or mentally incapable of signing the claim form is as follows:       Signature of Physician* or Healthcare Professional______________________________________________________________  Signature Date 08/30/21 (For scheduled repetitive transports, this form is not valid for transports performed more than 60 days after this date)    Printed Name & Credentials of Physician or Healthcare Professional (MD, , RN, etc )_Kaci Knox RN _______________________________  *Form must be signed by patient's attending physician for scheduled, repetitive transports   For non-repetitive, unscheduled ambulance transports, if unable to obtain the signature of the attending physician, any of the following may sign (choose appropriate option below)  [] Physician Assistant []  Clinical Nurse Specialist [x]  Registered Nurse  []  Nurse Practitioner  [] Discharge Planner

## 2021-08-30 NOTE — CASE MANAGEMENT
Case Management Progress Note    Patient name Farrah Kennedy  Location /-01 MRN [id number]  : 10/7/1924 Date 2021       LOS (days): 3  Geometric Mean LOS (GMLOS) (days):   Days to GMLOS:        BUNDLE:      OBJECTIVE:  Pt is a 80y o  year old /Civil Union, white or  [1], female with Mandaen preference of [address] admitted on  11:24 AM  Pt is admitted to -01 at [facility] with complaints of Acute respiratory failure with hypoxia ([facility] [address] )   Current admission status: Inpatient  Preferred Pharmacy:   [facility], [address]  Phone: [phone] Fax: [phone]    Primary Care Provider: Sridhar Bruno DO    Primary Insurance: Meena Stoll [facility]  Secondary Insurance:     PROGRESS NOTE:      Spoke with Ace Kirk- discussed [address] [place] [address] Avenue-  Only option at this time is SNF  A post acute care recommendation was made by your care team for [facility]  Discussed Freedom of Choice with POA  List of facilities given to POA via in person  POA aware the list is custom filtered for them by zip code location and that [facility] post acute providers are designated  Choices were [facility] and [facility]   - referrals were made    [place] of choice for hospice agencies were provided  A post acute care recommendation was made by your care team for [facility]  Discussed Freedom of Choice with POA  List of agencies given to POA via in person  POA aware the list is custom filtered for them by zip code location and that [facility]'s post acute providers are designated  - Choice is [facility]  - referral was made      [facility] has No beds  [facility] has accepted and will be reaching out to [facility] later this AM   Spoke to Selin MANZANARES at [facility] and she will review and accept  Pt needs COVID screen    Pt will be transport BLS    Medical Necessity for transportation was completed  Copy available for transport team and a copy was placed in bin to be scanned into the chart  Anticipate dc this afternoon, when Rosa M talks with [facility] and [employer]

## 2021-08-30 NOTE — DISCHARGE SUMMARY
[address]  Discharge- Michele Stone 10/7/1924, 80 y o  female MRN: [id number]  Unit/Bed#: -01 Encounter: [id number]  Primary Care Provider: Heaven Lopez DO   Date and time admitted to hospital: 8/27/2021 11:24 AM  * Acute respiratory failure with hypoxia [facility])  Assessment & Plan  Patient has acute respiratory failure with hypoxia secondary to acute CHF exacerbation  Continue oxygen via nasal cannula to maintain pulse ox more than 92% and placed on gentle diuresis limited by hypotension  Does not use any oxygen at home and is currently needing around 4-5 L of oxygen via nasal cannula  Now placed on comfort care/hospice care    Acute stroke due to ischemia [facility])  Assessment & Plan  Patient has an acute stroke with weakness noted of the right upper extremity and right lower extremity and also aphasia noted  Family decided on comfort care/hospice care    Acute systolic exacerbation of CHF (congestive heart failure) (HCC)  Assessment & Plan  Wt Readings from Last 3 Encounters:   08/28/21 50 2 kg (110 lb 10 7 oz)   Patient has acute CHF exacerbation  No previous 2D echo noted in the system  Was on Lasix at home which was recently being adjusted outpatient by PCP due to worsening leg swelling  Noticed to have anasarca with 2 to 3+ pitting edema bilateral lower extremity and bilateral pleural effusions  Received Lasix 40 mg IV x1 in the ER  Will continue Lasix 20 mg IV b i d  Low-dose due to hypotension and try to gently diurese if possible  Use midodrine to prevent hypotension  Please note patient does not want any invasive or aggressive interventions to be done and hence will not consider thoracentesis at this time or pressors or cardioversion    8/29:  Patient found to have acute change in mental status this morning and found to have an acute stroke involving weakness of her right upper extremity and right lower extremity with neglect    Discussed with family at length and place her on comfort care/hospice    Goals of care discussed at length with patient and niece at bedside  Patient will be DNR/DNI with no cardioversion or aggressive invasive interventions to be done  Patient family understand that due to her advanced age and critical condition at this time it is quite possible that she might not survive this episode  Will try medications however if she appears to be decompensating and worsening will discuss about comfort care          Hospice care  Assessment & Plan  Discussed with niece and got daughter  Patient is now comfort care/hospice due to acute stroke  Will need placement either in skilled nursing facility as she is unable to return home as she lives alone and has no children  Continue hospice care with morphine/Ativan for comfort  Currently  appears to be quite comfortable  She is not responding to any questions and unable to move her right arm or right leg  Unable to speak    Atrial fibrillation with RVR [facility])  Assessment & Plan  Patient has new onset AFib with RVR  Blood pressures are low normal   Initially placed on Cardizem drip and digoxin for rate control  Initially heart rate was 160s  Now down to 100-120  Cardizem drip has been discontinued  Briefly placed on oral Cardizem  as EF is unknown will avoid calcium channel blockers at this time and placed on metoprolol 25 mg oral twice daily    Continue telemetry monitoring and level 2 step-down  Patient is DNR/DNI and does not want to be cardioverted  Does not want full strength anticoagulation  Does not want any aggressive or invasive interventions to be done  Placed on midodrine 5 mg t i d  To try to raise her blood pressures so she can consistently get her beta-blocker and Lasix  Placed on Eliquis 2 5 mg twice daily    Monitor renal function closely    Discontinue Eliquis, midodrine and beta-blockers as patient is now comfort care/hospice    BEATRICE (acute kidney injury) ([facility] [address] )  Assessment & Plan  Patient has acute kidney injury with creatinine of 1 6  Previous labs from a few years ago showed normal creatinine of 1  Now on comfort care    Idiopathic hypotension  Assessment & Plan  Secondary to CHF exacerbation and AFib with RVR  Discharging Physician / Practitioner: Deisy Orlando MD  PCP: Brody Humphrey DO  Admission Date:   Admission Orders (From admission, onward)     Ordered        08/27/21 1532  Inpatient Admission  Once                   Discharge Date: 08/30/21    Medical Problems     Resolved Problems  Date Reviewed: 8/30/2021    None                Consultations During Hospital Stay:  · Cardiology    Procedures Performed:   · None    Significant Findings / Test Results:   XR chest 1 view portable    Result Date: 8/27/2021  Impression: Cardiomegaly with bilateral pleural effusions and probable bibasilar atelectasis most likely on the basis of congestive heart failure  Workstation performed: [id number]     Incidental Findings:   · None     Test Results Pending at Discharge (will require follow up): · None     Outpatient Tests Requested:  · None    Complications:  None    Reason for Admission:  Shortness of breath    Hospital Course:     Akila Aaron is a 80 y o  female patient who originally presented to the hospital on 8/27/2021 due to worsening shortness of breath found to be in acute CHF exacerbation AFib with RVR with acute kidney injury  Patient was being treated with rate controlled with low-dose Eliquis and diuretics however unfortunately on Sunday morning she had an acute stroke and is aphasic and unable to move her right arm and right leg  Patient is unable to communicate at this time  Due to multiple comorbidities and her advanced age family decided to make her comfort care/hospice care  It was also her directive that she did not want any aggressive invasive interventions to be done    She is currently comfortable on hospice protocol is being transition to skilled nursing facility as she lives alone at home and unable to return home        Please see above list of diagnoses and related plan for additional information  Condition at Discharge: poor     Discharge Day Visit / Exam:     Subjective:  Patient is unable to answer any questions or talk  Unable to move her right side  Appears to be comfortable  Vitals: Blood Pressure: 154/69 (08/30/21 0800)  Pulse: 85 (08/29/21 0840)  Temperature: 98 2 °F (36 8 °C) (08/30/21 0800)  Temp Source: Temporal (08/30/21 0800)  Respirations: 18 (08/30/21 0800)  Height: 4' 11" (149 9 cm) (08/27/21 1707)  Weight - Scale: 50 2 kg (110 lb 10 7 oz) (08/28/21 0600)  SpO2: 92 % (08/29/21 0840)  Exam:   Physical Exam  Vitals and nursing note reviewed  Constitutional:       Appearance: She is ill-appearing  HENT:      Head: Normocephalic and atraumatic  Right Ear: External ear normal       Left Ear: External ear normal       Nose: Nose normal    Cardiovascular:      Rate and Rhythm: Tachycardia present  Rhythm irregular  Heart sounds: Normal heart sounds  Pulmonary:      Effort: Pulmonary effort is normal       Comments: Moderate air entry bilaterally with decreased breath sounds on the bases  Abdominal:      General: Bowel sounds are normal       Palpations: Abdomen is soft  Tenderness: There is no abdominal tenderness  Musculoskeletal:      Comments: Unable to move the right arm and right leg   Skin:     General: Skin is warm and dry  Capillary Refill: Capillary refill takes less than 2 seconds  Neurological:      Comments: Unable to answer any questions  Unable to talk         Discussion with Family:  Discussed with sister at bedside    Discharge instructions/Information to patient and family:   See after visit summary for information provided to patient and family  Provisions for Follow-Up Care:  See after visit summary for information related to follow-up care and any pertinent home health orders  Disposition:     Other [facility]      For Discharges to [facility] SNF:   · Not Applicable to this Patient - Not Applicable to this Patient    Planned Readmission:  None     Discharge Statement:  I spent 35 minutes discharging the patient  This time was spent on the day of discharge  I had direct contact with the patient on the day of discharge  Greater than 50% of the total time was spent examining patient, answering all patient questions, arranging and discussing plan of care with patient as well as directly providing post-discharge instructions  Additional time then spent on discharge activities  Discharge Medications:  See after visit summary for reconciled discharge medications provided to patient and family        ** Please Note: This note has been constructed using a voice recognition system **

## 2021-08-30 NOTE — ASSESSMENT & PLAN NOTE
Patient has acute kidney injury with creatinine of 1 6  Previous labs from a few years ago showed normal creatinine of 1    Now on comfort care

## 2021-08-31 NOTE — UTILIZATION REVIEW
Notification of Discharge   This is a Notification of Discharge from our facility [address]  Please be advised that this patient has been discharge from our facility  Below you will find the admission and discharge date and time including the patients disposition  UTILIZATION REVIEW CONTACT:  Fabiola Hyatt  Utilization   Network Utilization Review Department  Phone: [phone] x carefully listen to the prompts  All voicemails are confidential   Email: [email or web address]     PHYSICIAN ADVISORY SERVICES:  FOR [id number]-[id number]-[id number] REVIEW - MEDICAL NECESSITY DENIAL  Phone: [phone]  Fax: [phone]  Email: [email or web address]     PRESENTATION DATE: 8/27/2021 11:24 AM  OBERVATION ADMISSION DATE:   INPATIENT ADMISSION DATE: 8/27/21  3:32 PM   DISCHARGE DATE: 8/30/2021  2:30 PM  DISPOSITION: Non [facility] Non [facility]      IMPORTANT INFORMATION:  Send all requests for admission clinical reviews, approved or denied determinations and any other requests to dedicated fax number below belonging to the campus where the patient is receiving treatment   List of dedicated fax numbers:  [address] DENIALS (Administrative/Medical Necessity) [phone]   [address] (Maternity/NICU/Pediatrics) [phone]   Rubi Gao [phone]   Shelbie Estrada [phone]   Paty Mcmahan [phone]   [facility] [address] [place] [address] [phone]   [facility]  [phone]   [address] [facility]  [address] [facility] [address] [facility] [phone]